# Patient Record
Sex: FEMALE | Race: WHITE | NOT HISPANIC OR LATINO | Employment: OTHER | URBAN - METROPOLITAN AREA
[De-identification: names, ages, dates, MRNs, and addresses within clinical notes are randomized per-mention and may not be internally consistent; named-entity substitution may affect disease eponyms.]

---

## 2017-03-15 PROBLEM — F32.A ANXIETY AND DEPRESSION: Status: ACTIVE | Noted: 2017-03-15

## 2017-03-15 PROBLEM — F41.9 ANXIETY AND DEPRESSION: Status: ACTIVE | Noted: 2017-03-15

## 2017-12-01 PROBLEM — K76.0 FATTY LIVER: Status: ACTIVE | Noted: 2017-12-01

## 2019-06-13 PROBLEM — K21.9 GASTROESOPHAGEAL REFLUX DISEASE WITHOUT ESOPHAGITIS: Status: ACTIVE | Noted: 2019-06-13

## 2020-02-20 PROBLEM — M70.61 TROCHANTERIC BURSITIS OF RIGHT HIP: Status: ACTIVE | Noted: 2020-02-20

## 2021-04-07 PROBLEM — G60.9 IDIOPATHIC PERIPHERAL NEUROPATHY: Status: ACTIVE | Noted: 2021-04-07

## 2021-05-21 PROBLEM — R25.1 TREMOR: Status: ACTIVE | Noted: 2021-05-21

## 2022-11-17 ENCOUNTER — LAB VISIT (OUTPATIENT)
Dept: LAB | Facility: HOSPITAL | Age: 72
End: 2022-11-17
Attending: NURSE PRACTITIONER
Payer: MEDICARE

## 2022-11-17 DIAGNOSIS — K58.2 IRRITABLE BOWEL SYNDROME WITH CONSTIPATION AND DIARRHEA: ICD-10-CM

## 2022-11-17 DIAGNOSIS — K52.9 INFLAMMATORY BOWEL DISEASE: ICD-10-CM

## 2022-11-17 DIAGNOSIS — R10.13 ABDOMINAL PAIN, EPIGASTRIC: ICD-10-CM

## 2022-11-17 DIAGNOSIS — K58.2 IRRITABLE BOWEL SYNDROME WITH CONSTIPATION AND DIARRHEA: Primary | ICD-10-CM

## 2022-11-17 PROCEDURE — 82653 EL-1 FECAL QUANTITATIVE: CPT | Performed by: NURSE PRACTITIONER

## 2022-11-21 LAB — ELASTASE 1, FECAL: 321 MCG/G

## 2023-09-12 ENCOUNTER — CLINICAL SUPPORT (OUTPATIENT)
Dept: INTERNAL MEDICINE | Facility: CLINIC | Age: 73
End: 2023-09-12
Payer: MEDICARE

## 2023-09-12 DIAGNOSIS — Z71.3 NUTRITIONAL COUNSELING: Primary | ICD-10-CM

## 2023-09-12 PROCEDURE — 97802 MEDICAL NUTRITION INDIV IN: CPT | Mod: S$GLB,,,

## 2023-09-12 PROCEDURE — 97802 PR MED NUTR THER, 1ST, INDIV, EA 15 MIN: ICD-10-PCS | Mod: S$GLB,,,

## 2023-09-12 NOTE — PROGRESS NOTES
"Nutrition Assessment  Session Time:  60 minutes      Client name:  Carole Sarabia  :  1950  Age:  73 y.o.  Gender:  female    Client states: Patient reports she has Type 2 diabetes, IBS and gluten intolerance. Interested in discussing healthy food choices with these dietary restrictions. Patient sees Dr. Tanmay Olivo (gastrologist). Enjoys cooking sometimes, but cant stand for too long. Comfortable reading food labels.     Anthropometrics  Height:  5'7"     Weight:  171  BMI:  26.8    Clinical Signs/Symptoms  N/V/D:  IBS, upset stomach after eating gluten   Appetite:  good       Past Medical History:   Diagnosis Date    Depression     Fibrocystic breast     GERD (gastroesophageal reflux disease)     Goiter     Hyperlipidemia     Hypertension     Hypothyroidism        Past Surgical History:   Procedure Laterality Date    CHOLECYSTECTOMY      THUMB ARTHROSCOPY         Medications    has a current medication list which includes the following prescription(s): alprazolam, aspirin, atorvastatin, eb-n5 dr, benicar, bupropion, cholecalciferol (vitamin d3), cyanocobalamin-cobamamide, dicyclomine, diphenoxylate-atropine 2.5-0.025 mg, esomeprazole magnesium, folic acid/multivit-min/lutein, gabapentin, hydrocodone-acetaminophen, lactobacillus rhamnosus gg, levocetirizine, levothyroxine, levothyroxine, levothyroxine, metoprolol succinate, ondansetron, potassium, simvastatin, terbinafine hcl, thyroid (pork), and topiramate.    Vitamins, Minerals, and/or Supplements:  multivitamin One A Day 50+, Vitamin D3     Food/Medication Interactions:  Reviewed     Food Allergies or Intolerances:  gluten     Social History    Marital status:    Employment:  retired    Social History     Tobacco Use    Smoking status: Never    Smokeless tobacco: Never   Substance Use Topics    Alcohol use: No        Lab Reports   Sodium   Date Value Ref Range Status   02/10/2021 142 136 - 145 mmol/L Final     Potassium   Date Value Ref Range " Status   02/10/2021 3.8 3.5 - 5.1 mmol/L Final     Chloride   Date Value Ref Range Status   02/10/2021 106 95 - 110 mmol/L Final     CO2   Date Value Ref Range Status   02/10/2021 26 22 - 31 mmol/L Final     Glucose   Date Value Ref Range Status   02/10/2021 114 (H) 70 - 110 mg/dL Final     Comment:     The ADA recommends the following guidelines for fasting glucose:    Normal:       less than 100 mg/dL    Prediabetes:  100 mg/dL to 125 mg/dL    Diabetes:     126 mg/dL or higher       BUN   Date Value Ref Range Status   02/10/2021 18 7 - 18 mg/dL Final     Creatinine   Date Value Ref Range Status   02/10/2021 0.87 0.50 - 1.40 mg/dL Final     Calcium   Date Value Ref Range Status   02/10/2021 11.1 (H) 8.4 - 10.2 mg/dL Final     Total Protein   Date Value Ref Range Status   02/10/2021 7.4 6.0 - 8.4 g/dL Final     Albumin   Date Value Ref Range Status   02/10/2021 4.7 3.5 - 5.2 g/dL Final     Total Bilirubin   Date Value Ref Range Status   02/10/2021 0.5 0.2 - 1.3 mg/dL Final     Alkaline Phosphatase   Date Value Ref Range Status   02/10/2021 107 38 - 145 U/L Final     AST   Date Value Ref Range Status   02/10/2021 63 (H) 14 - 36 U/L Final     ALT   Date Value Ref Range Status   02/10/2021 58 (H) 0 - 35 U/L Final     Anion Gap   Date Value Ref Range Status   02/10/2021 10 8 - 16 mmol/L Final     eGFR if    Date Value Ref Range Status   02/10/2021 >60 >60 mL/min/1.73 m^2 Final     eGFR if non    Date Value Ref Range Status   02/10/2021 >60 >60 mL/min/1.73 m^2 Final     Comment:     Calculation used to obtain the estimated glomerular filtration  rate (eGFR) is the CKD-EPI equation.         Lab Results   Component Value Date    WBC 8.43 02/10/2021    HGB 15.5 02/10/2021    HCT 46.4 02/10/2021    MCV 94 02/10/2021     02/10/2021        Lab Results   Component Value Date    CHOL 154 10/21/2020     Lab Results   Component Value Date    HDL 54 10/21/2020     Lab Results   Component Value  "Date    LDLCALC 69.0 10/21/2020     Lab Results   Component Value Date    TRIG 155 (H) 10/21/2020     Lab Results   Component Value Date    CHOLHDL 35.1 10/21/2020     No results found for: "LABA1C", "HGBA1C"  BP Readings from Last 1 Encounters:   02/11/23 (!) 177/80       Food History  Breakfast:  scrambled eggs, 2 sausage links, sugar-free cranberry and pineapple juice, blueberry muffins sometimes  Mid-morning Snack: eating more fruit since she found out she is diabetic- grapes, banana, sugar-free ice cream/popsicles  Lunch:  green salad with chicken, rotisserie chicken, fried fish, fish sandwich from [employer], limited fried foods, shrimp  Mid-afternoon Snack:  n/a  Dinner:  baked potato or sweet potatoes, Fritos and chili, homemade Chili with cheese  Snack:  stopped eating sweets and already lost 10 lbs  *Fluid intake:  a lot of water, tea with Splenda, coffee with Splenda and Coffee Mate creamer, no soda anymore    Exercise History:  rides stationary bike occasionally, walk around the house a lot, read a lot of articles on phone, read books    Cultural/Spiritual/Personal Preferences:  None identified    Support System:  spouse    State of Change:  Contemplation    Barriers to Change:  gluten dietary restrictions     Diagnosis    Excessive sugar intake related to increased intake of processed foods as evidenced by elevated A1c.    Intervention    RMR (Method:  [place] [address] Jeor):  1305 kcal  Activity Factor:  1.2    WHITNEY:  1566 kcal    Goals:  1.  Continue eating balanced meals, eggs at breakfast  2.  Continue eating less sweets and more healthy options we discussed today  3.  Increase exercise- 30 minutes walking per day     Macronutrient Goals:   Carbohydrates- 40%- 156 g  Protein- 30%- 117 g   Fat- 30%- 52 g     Nutrition Education  The following education was provided to the patient:  Complimented patient on proactive role in health maintenance.  Complimented patient on dietary compliance/modifications and " resulting health improvements.  Complimented patient on physical activity efforts.  Discussed CHO Counting.   Discussed meal planning/[employer] My Plate design.  Discussed healthy snacking.   Discussed label reading.  Discussed weight management.  Discussed Heart Healthy Eating.  Suggested dietary modifications based on current dietary behaviors and individual food preferences.  Discussed macronutrient distribution among meals and snacks, including CHO, protein, and fat recommendations and its importance/benefits.  Discussed physical activity guidelines and its associated benefits.  Discussed tips for eating healthy when dining out.  Discussed nutrition-related lab values and dietary and/or lifestyle factors affecting them.  Discussed sugary foods and/or beverages (potential health consequences of excessive sugar intake, ways to reduce sugar intake, healthy beverage alternatives, etc.).  Discussed recommended servings of fruit/day and food sources of such.  Discussed recommended servings of non-starchy vegetables/day and food sources of such.  Discussed recommended fiber intake and food sources of such.  Discussed benefits of adequate hydration and recommended fluid intake.  Discussed [place] client resources (may include but not limited to Eat Fit Shopping List, Fueling Well on the Go, and Meal Planning Guide).  Discussed vitamin/mineral recommendations (may include but not limited to MVI, Vitamin D, Calcium, and/or Iron) and associated food sources.  Discussed importance of small behavior/habit changes in improving long-term adherence and sustainability.  Discussed goal setting.  Discussed the pros and cons of fad diets and provided recommendations regarding short and long term adherence if applicable.  Provided ongoing support, encouragement, and guidance toward improved health efforts.    Patient verbalized understanding of nutrition education and recommendations received.    Handouts Provided  Meal Planning Guide  Eat  Fit Shopping List  Eat Fit Teresa  Fueling Well On-The-Go    Monitoring/Evaluation    Monitor the following:  Weight  BMI  % Body Fat  Caloric intake      Follow Up Plan:  Communication with referring healthcare provider is unnecessary at this time as patient presented as part of annual wellness exam.  However, will follow up with patient in 1-2 years.

## 2023-09-13 ENCOUNTER — TELEPHONE (OUTPATIENT)
Dept: SPORTS MEDICINE | Facility: CLINIC | Age: 73
End: 2023-09-13
Payer: MEDICARE

## 2023-09-13 DIAGNOSIS — Z71.3 NUTRITIONAL COUNSELING: Primary | ICD-10-CM

## 2023-09-13 NOTE — TELEPHONE ENCOUNTER
----- Message from Verenice Rodríguez sent at 9/13/2023 12:13 PM CDT -----  Regarding: Nutrition Referral  Good Afternoon    Can you please enter a Nutrition Consult referral ([id number]) for Enriqueta Sarabia for Nutritional Counseling.  Thank you!    Verenice Rodríguez

## 2023-09-13 NOTE — PATIENT INSTRUCTIONS
Name: Carole Sarabia   Date: 09/13/2023    Recommended daily energy requirements to reach your goals:  1566 Calories  117 grams Protein  156 grams Carbohydrates  52 grams Fat  86 ounces of fluid per day

## 2023-09-15 ENCOUNTER — TELEPHONE (OUTPATIENT)
Dept: SPORTS MEDICINE | Facility: CLINIC | Age: 73
End: 2023-09-15
Payer: MEDICARE

## 2023-09-15 DIAGNOSIS — E11.9 DIABETES MELLITUS, TYPE II: Primary | ICD-10-CM

## 2023-09-15 DIAGNOSIS — E11.9 DIABETES: ICD-10-CM

## 2023-09-15 DIAGNOSIS — Z71.3 NUTRITIONAL COUNSELING: ICD-10-CM

## 2023-09-15 NOTE — TELEPHONE ENCOUNTER
----- Message from Verenice Marcos sent at 9/15/2023 12:27 PM CDT -----  Regarding: RE: Nutrition Referral  Hi there!    Okay, thanks for that info! She told me she was diagnosed with Type 2 diabetes from a non-[facility] provider. Is there a way we can add this diagnosis so her visit would be covered by insurance?    Thanks!  ----- Message -----  From: Cheyenne Sarabia  Sent: 9/13/2023  12:45 PM CDT  To: Verenice Rodríguez  Subject: RE: Nutrition Referral                           Verenice -     Pt does not have a billable diagnosis per insurance, Medicare. Pt's estimated $92 out of pocket cost. In order to proceed with signing order without a billable diagnosis, the pt needs to sign a waiver.     Are you able to reach out to pt's PCP for referral & completion of waiver?    Please let us know if you have any additional questions or concerns.     Thank you-    Cheyenne Sarabia MS, OTC  Clinical Assistant to Je Galicia MD  [facility] [place]  P: [phone]  F: [phone]    ----- Message -----  From: Verenice Rodríguez  Sent: 9/13/2023  12:16 PM CDT  To: [facility] Staff  Subject: Nutrition Referral                               Good Afternoon    Can you please enter a Nutrition Consult referral ([id number]) for Enriqueta Sarabia for Nutritional Counseling.  Thank you!    Verenice Rodríguez

## 2023-09-18 ENCOUNTER — TELEPHONE (OUTPATIENT)
Dept: OBSTETRICS AND GYNECOLOGY | Facility: CLINIC | Age: 73
End: 2023-09-18
Payer: MEDICARE

## 2023-09-18 NOTE — TELEPHONE ENCOUNTER
----- Message from Rachel Tran sent at 9/15/2023  3:58 PM CDT -----  Regarding: sooner appt  Contact: pt  Type:  Sooner Apoointment Request    Caller is requesting a sooner appointment.  Caller declined first available appointment listed below.  Caller will not accept being placed on the waitlist and is requesting a message be sent to doctor.  Name of Caller:pt    Symptoms:sharp pains due to vaginal prolapse    Would the patient rather a call back or a response via [facility]? Call back    Best Call Back Number:[phone]    Additional Information: sts she would like to get an appt for vaginal Prolapse--please advise and thank you

## 2023-12-11 ENCOUNTER — TELEPHONE (OUTPATIENT)
Dept: OBSTETRICS AND GYNECOLOGY | Facility: CLINIC | Age: 73
End: 2023-12-11
Payer: MEDICARE

## 2023-12-11 NOTE — TELEPHONE ENCOUNTER
I left a voicemail for the patient with an appt on 1/4/24 at 3pm with Dr. Rose with instructions to call back if that did not work for her.

## 2023-12-11 NOTE — TELEPHONE ENCOUNTER
----- Message from Hari Osorio MA sent at 12/11/2023 11:06 AM CST -----  Contact: Self    ----- Message -----  From: Isabel Simpson  Sent: 11/30/2023   2:59 PM CST  To: #    Type:  Sooner Appointment Request    Caller is requesting a sooner appointment.  Caller declined first available appointment listed below.  Caller will not accept being placed on the waitlist and is requesting a message be sent to doctor.    Name of Caller:  Self  When is the first available appointment?  12/20/23  Symptoms:  vaginal prolapse and UTI  Would the patient rather a call back or a response via [facility]? call  Best Call Back Number:  [phone]  Please call the patient back at the phone number listed above to advise. Thank you!

## 2024-01-04 ENCOUNTER — OFFICE VISIT (OUTPATIENT)
Dept: OBSTETRICS AND GYNECOLOGY | Facility: CLINIC | Age: 74
End: 2024-01-04
Payer: MEDICARE

## 2024-01-04 VITALS
WEIGHT: 162.69 LBS | BODY MASS INDEX: 25.48 KG/M2 | DIASTOLIC BLOOD PRESSURE: 72 MMHG | SYSTOLIC BLOOD PRESSURE: 122 MMHG

## 2024-01-04 DIAGNOSIS — R39.15 URINARY URGENCY: Primary | ICD-10-CM

## 2024-01-04 LAB
BILIRUBIN, UA POC OHS: NEGATIVE
BLOOD, UA POC OHS: NEGATIVE
CLARITY, UA POC OHS: CLEAR
COLOR, UA POC OHS: ABNORMAL
GLUCOSE, UA POC OHS: NEGATIVE
KETONES, UA POC OHS: NEGATIVE
LEUKOCYTES, UA POC OHS: ABNORMAL
NITRITE, UA POC OHS: POSITIVE
PH, UA POC OHS: 5.5
PROTEIN, UA POC OHS: ABNORMAL
SPECIFIC GRAVITY, UA POC OHS: >=1.03
UROBILINOGEN, UA POC OHS: 0.2

## 2024-01-04 PROCEDURE — 99999 PR PBB SHADOW E&M-EST. PATIENT-LVL II: CPT | Mod: PBBFAC,,, | Performed by: SPECIALIST

## 2024-01-04 PROCEDURE — 99212 OFFICE O/P EST SF 10 MIN: CPT | Mod: PBBFAC,PN | Performed by: SPECIALIST

## 2024-01-04 PROCEDURE — 99203 OFFICE O/P NEW LOW 30 MIN: CPT | Mod: S$PBB,,, | Performed by: SPECIALIST

## 2024-01-04 PROCEDURE — 81003 URINALYSIS AUTO W/O SCOPE: CPT | Mod: PBBFAC,PN | Performed by: SPECIALIST

## 2024-01-04 PROCEDURE — 87086 URINE CULTURE/COLONY COUNT: CPT | Performed by: SPECIALIST

## 2024-01-04 PROCEDURE — 99999PBSHW POCT URINALYSIS(INSTRUMENT): Mod: PBBFAC,,,

## 2024-01-04 RX ORDER — PHENAZOPYRIDINE HYDROCHLORIDE 200 MG/1
200 TABLET, FILM COATED ORAL 3 TIMES DAILY
COMMUNITY
Start: 2023-12-21

## 2024-01-06 LAB
BACTERIA UR CULT: NORMAL
BACTERIA UR CULT: NORMAL

## 2024-01-16 NOTE — PROGRESS NOTES
72 yo WF  presents for eval possible uterine prolapse  and UTI with complaint urinary frequency and discomfort denies overt dysuria, hematuria, vaginal d/c, f/c, n/v  Past Medical History:   Diagnosis Date    Depression     Fibrocystic breast     GERD (gastroesophageal reflux disease)     Goiter     Hyperlipidemia     Hypertension     Hypothyroidism        Past Surgical History:   Procedure Laterality Date    CHOLECYSTECTOMY      THUMB ARTHROSCOPY         Family History   Problem Relation Age of Onset    Arthritis Mother     Heart disease Father     Cancer Father     Kidney cancer Father     No Known Problems Sister     Diabetes Brother        Social History     Socioeconomic History    Marital status:     Number of children: 2   Occupational History    Occupation: housewife   Tobacco Use    Smoking status: Never    Smokeless tobacco: Never   Substance and Sexual Activity    Alcohol use: No    Drug use: No    Sexual activity: Not Currently       Current Outpatient Medications   Medication Sig Dispense Refill    aspirin (ECOTRIN) 81 MG EC tablet Take 81 mg by mouth once daily.      atorvastatin (LIPITOR) 10 MG tablet Take 10 mg by mouth once daily.      B6-levomefolate-B12-D3-ALA (EB-N5 DR) 35 mg-3 mg- 2 mg-62.5 mcg CpDR Take 1 capsule by mouth Daily.      phenazopyridine (PYRIDIUM) 200 MG tablet Take 200 mg by mouth 3 (three) times daily.      ALPRAZolam (XANAX) 0.5 MG tablet Take 1 tablet (0.5 mg total) by mouth nightly as needed for Anxiety. 30 tablet 0    BENICAR 20 mg tablet Take 20 mg by mouth once daily.  3    buPROPion (WELLBUTRIN) 100 MG tablet TAKE 1 TABLET (100 MG TOTAL) BY MOUTH 3 (THREE) TIMES DAILY. 270 tablet 1    cholecalciferol, vitamin D3, (VITAMIN D3) 50 mcg (2,000 unit) Cap Take 1 capsule by mouth once daily.      cyanocobalamin-cobamamide 5,000-100 mcg Subl Place under the tongue.      dicyclomine (BENTYL) 20 mg tablet Take 20 mg by mouth every 6 (six) hours.       diphenoxylate-atropine 2.5-0.025 mg (LOMOTIL) 2.5-0.025 mg per tablet TAKE 1 TABLET BY MOUTH THREE TIMES A DAY AS NEEDED DIARRHEA 30 tablet 1    esomeprazole magnesium (NEXIUM ORAL) Take 20 mg by mouth once daily.       folic acid/multivit-min/lutein (CENTRUM SILVER ORAL) Take 1 capsule by mouth once daily.      gabapentin (NEURONTIN) 100 MG capsule Take 1 capsule (100 mg total) by mouth 2 (two) times daily. 180 capsule 3    HYDROcodone-acetaminophen (NORCO) 5-325 mg per tablet Take 1 tablet by mouth every 6 (six) hours as needed for Pain. 15 tablet 0    Lactobacillus rhamnosus GG (CULTURELLE) 10 billion cell capsule Take 1 capsule by mouth once daily.      levocetirizine (XYZAL) 5 MG tablet Take 1 tablet (5 mg total) by mouth every evening. 90 tablet 3    levothyroxine (LEVOXYL) 25 MCG tablet One daily 30 tablet 2    levothyroxine (LEVOXYL) 25 MCG tablet One daily 90 tablet 0    levothyroxine (SYNTHROID) 25 MCG tablet Take 1 tablet (25 mcg total) by mouth before breakfast. 90 tablet 1    metoprolol succinate (TOPROL-XL) 50 MG 24 hr tablet TAKE 1/2 TABLET BY MOUTH TWICE A DAY. NEW DIRECTIONS 90 tablet 4    ondansetron (ZOFRAN-ODT) 4 MG TbDL Take 1-2 tablets (4-8 mg total) by mouth every 8 (eight) hours as needed. 15 tablet 0    potassium 99 mg Tab Take 1 tablet by mouth once.      simvastatin (ZOCOR) 20 MG tablet Take 20 mg by mouth every evening.      terbinafine HCL (LAMISIL) 250 mg tablet Take 250 mg by mouth once daily.      thyroid, pork, (ARMOUR THYROID) 30 mg Tab Take 1 tablet (30 mg total) by mouth before breakfast. 30 tablet 1    topiramate (TOPAMAX) 25 MG tablet TAKE 1 TABLET BY MOUTH EVERY DAY AT NIGHT 90 tablet 3     No current facility-administered medications for this visit.       Review of patient's allergies indicates:   Allergen Reactions    Gluten     Codeine Nausea Only    Sulfa (sulfonamide antibiotics) Nausea Only       Review of System:   General: no chills, fever, night sweats, weight gain or  weight loss  Psychological: no depression or suicidal ideation  Breasts: no new or changing breast lumps, nipple discharge or masses.  Respiratory: no cough, shortness of breath, or wheezing  Cardiovascular: no chest pain or dyspnea on exertion  Gastrointestinal: no abdominal pain, change in bowel habits, or black or bloody stools  Genito-Urinary: Occasional urinary incontinence,Positive  urinary frequency/urgency No  pelvic pain or abnormal vaginal bleeding.  Musculoskeletal: no gait disturbance or muscular weakness                                               General Appearance    A and O x 4, Cooperative, no distress   Breasts    Abdomen   Deferred   Soft, non-tender, bowel sounds active all four quadrants,  no masses, no organomegaly    Genitourinary:   External rectal exam shows no thrombosed external hemorrhoids.   Pelvic exam was performed with patient supine.  No labial fusion.  There is no rash, lesion or injury on the right labia.  There is no rash, lesion or injury on the left labia.  No bleeding and no signs of injury around the vaginal introitus, urethra is without lesions and well supported. The cervix is visualized with no discharge, lesions or friability.  No vaginal discharge found.  Grade 1 -2 midline cystocele with POP 1 cervicouterine prolapse   Bimanual exam:  The urethra is normal to palpation and there are no palpable vaginal wall masses.  Uterus is not deviated, not enlarged, not fixed, normal shape and not tender.  Cervix exhibits no motion tenderness.   Right adnexum displays no mass and no tenderness.  Left adnexum displays no mass and no tenderness.   Extremities: Extremities normal, atraumatic, no cyanosis or edema                     NOTE  NURSING PERSONAL PRESENT FOR ENTIRE PHYSICAL EXAM     U/A neg LE neg Nitrite    Discussed POP and suspected urinary frequency secondary to the above  Rec U/a C and s and discussed treatment options  Will follow the above  Answered all questions    I  spent a total of 30 minutes on the day of the visit. This includes face to face time and non-face to face time preparing to see the patient (eg, review of tests), obtaining and/or reviewing separately obtained history, documenting clinical information in the electronic or other health record, independently interpreting results and communicating results to the patient/family/caregiver, or care coordinator.

## 2024-02-05 ENCOUNTER — TELEPHONE (OUTPATIENT)
Dept: OBSTETRICS AND GYNECOLOGY | Facility: CLINIC | Age: 74
End: 2024-02-05
Payer: MEDICARE

## 2024-02-05 NOTE — TELEPHONE ENCOUNTER
Spoke with patient.  Patient would like to further discuss tx options for prolapse.  Interested in pessary. Patient is seeing a different office for this and has had trouble getting pessary ordered. Pt advised we can not guarantee pessary or amount of time it may take to receive the pessary she may need without a visit. Patient scheduled for next available appt.  Pt expressed verbal understanding.

## 2024-02-05 NOTE — TELEPHONE ENCOUNTER
----- Message from Martha Ward sent at 2/5/2024 11:47 AM CST -----  Contact: pt  Type: Needs Medical Advice  Who Called:  pt  Best Call Back Number: [phone]    Additional Information: pt is calling the office needs to schedule appt. Please call back and advise.

## 2024-02-22 ENCOUNTER — OFFICE VISIT (OUTPATIENT)
Dept: OBSTETRICS AND GYNECOLOGY | Facility: CLINIC | Age: 74
End: 2024-02-22
Payer: MEDICARE

## 2024-02-22 VITALS
HEIGHT: 67 IN | WEIGHT: 163.13 LBS | BODY MASS INDEX: 25.6 KG/M2 | DIASTOLIC BLOOD PRESSURE: 76 MMHG | SYSTOLIC BLOOD PRESSURE: 124 MMHG

## 2024-02-22 DIAGNOSIS — N81.4 CYSTOCELE WITH UTERINE PROLAPSE: ICD-10-CM

## 2024-02-22 DIAGNOSIS — N81.4 UTERINE PROLAPSE: Primary | ICD-10-CM

## 2024-02-22 PROCEDURE — 99999 PR PBB SHADOW E&M-EST. PATIENT-LVL IV: CPT | Mod: PBBFAC,,, | Performed by: SPECIALIST

## 2024-02-22 PROCEDURE — 99213 OFFICE O/P EST LOW 20 MIN: CPT | Mod: S$PBB,,, | Performed by: SPECIALIST

## 2024-02-22 PROCEDURE — 99214 OFFICE O/P EST MOD 30 MIN: CPT | Mod: PBBFAC,PN | Performed by: SPECIALIST

## 2024-02-22 NOTE — PROGRESS NOTES
72 yo WF  presents for eval possible uterine prolapse  and UTI with complaint urinary frequency and discomfort denies overt dysuria, hematuria, vaginal d/c, f/c, n/v       Past Medical History:   Diagnosis Date    Depression      Fibrocystic breast      GERD (gastroesophageal reflux disease)      Goiter      Hyperlipidemia      Hypertension      Hypothyroidism                 Past Surgical History:   Procedure Laterality Date    CHOLECYSTECTOMY        THUMB ARTHROSCOPY                   Family History   Problem Relation Age of Onset    Arthritis Mother      Heart disease Father      Cancer Father      Kidney cancer Father      No Known Problems Sister      Diabetes Brother           Social History           Socioeconomic History    Marital status:     Number of children: 2   Occupational History    Occupation: housewife   Tobacco Use    Smoking status: Never    Smokeless tobacco: Never   Substance and Sexual Activity    Alcohol use: No    Drug use: No    Sexual activity: Not Currently         Current Medications          Current Outpatient Medications   Medication Sig Dispense Refill    aspirin (ECOTRIN) 81 MG EC tablet Take 81 mg by mouth once daily.        atorvastatin (LIPITOR) 10 MG tablet Take 10 mg by mouth once daily.        B6-levomefolate-B12-D3-ALA (EB-N5 DR) 35 mg-3 mg- 2 mg-62.5 mcg CpDR Take 1 capsule by mouth Daily.        phenazopyridine (PYRIDIUM) 200 MG tablet Take 200 mg by mouth 3 (three) times daily.        ALPRAZolam (XANAX) 0.5 MG tablet Take 1 tablet (0.5 mg total) by mouth nightly as needed for Anxiety. 30 tablet 0    BENICAR 20 mg tablet Take 20 mg by mouth once daily.   3    buPROPion (WELLBUTRIN) 100 MG tablet TAKE 1 TABLET (100 MG TOTAL) BY MOUTH 3 (THREE) TIMES DAILY. 270 tablet 1    cholecalciferol, vitamin D3, (VITAMIN D3) 50 mcg (2,000 unit) Cap Take 1 capsule by mouth once daily.        cyanocobalamin-cobamamide 5,000-100 mcg Subl Place under the tongue.         dicyclomine (BENTYL) 20 mg tablet Take 20 mg by mouth every 6 (six) hours.        diphenoxylate-atropine 2.5-0.025 mg (LOMOTIL) 2.5-0.025 mg per tablet TAKE 1 TABLET BY MOUTH THREE TIMES A DAY AS NEEDED DIARRHEA 30 tablet 1    esomeprazole magnesium (NEXIUM ORAL) Take 20 mg by mouth once daily.         folic acid/multivit-min/lutein (CENTRUM SILVER ORAL) Take 1 capsule by mouth once daily.        gabapentin (NEURONTIN) 100 MG capsule Take 1 capsule (100 mg total) by mouth 2 (two) times daily. 180 capsule 3    HYDROcodone-acetaminophen (NORCO) 5-325 mg per tablet Take 1 tablet by mouth every 6 (six) hours as needed for Pain. 15 tablet 0    Lactobacillus rhamnosus GG (CULTURELLE) 10 billion cell capsule Take 1 capsule by mouth once daily.        levocetirizine (XYZAL) 5 MG tablet Take 1 tablet (5 mg total) by mouth every evening. 90 tablet 3    levothyroxine (LEVOXYL) 25 MCG tablet One daily 30 tablet 2    levothyroxine (LEVOXYL) 25 MCG tablet One daily 90 tablet 0    levothyroxine (SYNTHROID) 25 MCG tablet Take 1 tablet (25 mcg total) by mouth before breakfast. 90 tablet 1    metoprolol succinate (TOPROL-XL) 50 MG 24 hr tablet TAKE 1/2 TABLET BY MOUTH TWICE A DAY. NEW DIRECTIONS 90 tablet 4    ondansetron (ZOFRAN-ODT) 4 MG TbDL Take 1-2 tablets (4-8 mg total) by mouth every 8 (eight) hours as needed. 15 tablet 0    potassium 99 mg Tab Take 1 tablet by mouth once.        simvastatin (ZOCOR) 20 MG tablet Take 20 mg by mouth every evening.        terbinafine HCL (LAMISIL) 250 mg tablet Take 250 mg by mouth once daily.        thyroid, pork, (ARMOUR THYROID) 30 mg Tab Take 1 tablet (30 mg total) by mouth before breakfast. 30 tablet 1    topiramate (TOPAMAX) 25 MG tablet TAKE 1 TABLET BY MOUTH EVERY DAY AT NIGHT 90 tablet 3      No current facility-administered medications for this visit.                 Review of patient's allergies indicates:   Allergen Reactions    Gluten      Codeine Nausea Only    Sulfa (sulfonamide  antibiotics) Nausea Only         Review of System:   General: no chills, fever, night sweats, weight gain or weight loss  Psychological: no depression or suicidal ideation  Breasts: no new or changing breast lumps, nipple discharge or masses.  Respiratory: no cough, shortness of breath, or wheezing  Cardiovascular: no chest pain or dyspnea on exertion  Gastrointestinal: no abdominal pain, change in bowel habits, or black or bloody stools  Genito-Urinary: Occasional urinary incontinence,Positive  urinary frequency/urgency No  pelvic pain or abnormal vaginal bleeding.  Musculoskeletal: no gait disturbance or muscular weakness                                                                    General Appearance    A and O x 4, Cooperative, no distress   Breasts     Abdomen   Deferred   Soft, non-tender, bowel sounds active all four quadrants,  no masses, no organomegaly     Genitourinary:   External rectal exam shows no thrombosed external hemorrhoids.   Pelvic exam was performed with patient supine.  No labial fusion.  There is no rash, lesion or injury on the right labia.  There is no rash, lesion or injury on the left labia.  No bleeding and no signs of injury around the vaginal introitus, urethra is without lesions and well supported. The cervix is visualized with no discharge, lesions or friability.  No vaginal discharge found.  Grade 3  midline cystocele with POP 1 cervicouterine prolapse   Bimanual exam:  The urethra is normal to palpation and there are no palpable vaginal wall masses.  Uterus is not deviated, not enlarged, not fixed, normal shape and not tender.  Cervix exhibits no motion tenderness.   Right adnexum displays no mass and no tenderness.  Left adnexum displays no mass and no tenderness.   Extremities: Extremities normal, atraumatic, no cyanosis or edema                              NOTE  NURSING PERSONAL PRESENT FOR ENTIRE PHYSICAL EXAM      U/A neg LE neg Nitrite     Discussed POP and suspected  urinary frequency secondary to the above  I hada 20 min discussion regarding POP and discussed options of pessary fitting vs TVH with possible AXUS graft SSS  Discussed risks and benefits of each option sand after discussion, pt agreeable to pessary fitting  Will have pt RTO next week for fitting and will follow    I spent a total of 30 minutes on the day of the visit. This includes face to face time and non-face to face time preparing to see the patient (eg, review of tests), obtaining and/or reviewing separately obtained history, documenting clinical information in the electronic or other health record, independently interpreting results and communicating results to the patient/family/caregiver, or care coordinator.

## 2024-02-29 ENCOUNTER — OFFICE VISIT (OUTPATIENT)
Dept: OBSTETRICS AND GYNECOLOGY | Facility: CLINIC | Age: 74
End: 2024-02-29
Payer: MEDICARE

## 2024-02-29 VITALS
SYSTOLIC BLOOD PRESSURE: 122 MMHG | BODY MASS INDEX: 25.62 KG/M2 | DIASTOLIC BLOOD PRESSURE: 82 MMHG | WEIGHT: 163.56 LBS

## 2024-02-29 DIAGNOSIS — N81.4 UTERINE PROLAPSE: Primary | ICD-10-CM

## 2024-02-29 DIAGNOSIS — N81.4 CYSTOCELE WITH UTERINE PROLAPSE: ICD-10-CM

## 2024-02-29 DIAGNOSIS — R39.15 URINARY URGENCY: ICD-10-CM

## 2024-02-29 PROCEDURE — 99215 OFFICE O/P EST HI 40 MIN: CPT | Mod: PBBFAC,PN,25 | Performed by: SPECIALIST

## 2024-02-29 PROCEDURE — 99999 PR PBB SHADOW E&M-EST. PATIENT-LVL V: CPT | Mod: PBBFAC,,, | Performed by: SPECIALIST

## 2024-02-29 PROCEDURE — 99213 OFFICE O/P EST LOW 20 MIN: CPT | Mod: 25,S$PBB,, | Performed by: SPECIALIST

## 2024-02-29 PROCEDURE — 57160 INSERT PESSARY/OTHER DEVICE: CPT | Mod: PBBFAC,PN | Performed by: SPECIALIST

## 2024-02-29 PROCEDURE — 57160 INSERT PESSARY/OTHER DEVICE: CPT | Mod: S$PBB,,, | Performed by: SPECIALIST

## 2024-03-11 ENCOUNTER — TELEPHONE (OUTPATIENT)
Dept: OBSTETRICS AND GYNECOLOGY | Facility: CLINIC | Age: 74
End: 2024-03-11
Payer: MEDICARE

## 2024-03-11 NOTE — TELEPHONE ENCOUNTER
----- Message from July Finley sent at 3/11/2024  8:49 AM CDT -----  Type:  Needs Medical Advice    Who Called: pt   Best Call Back Number: [phone]      Additional Information:  Requesting call back wants to know if  Rosemarycamelia came in for her apt today @ 1    please advise thank you

## 2024-03-14 ENCOUNTER — TELEPHONE (OUTPATIENT)
Dept: OBSTETRICS AND GYNECOLOGY | Facility: CLINIC | Age: 74
End: 2024-03-14
Payer: MEDICARE

## 2024-03-14 NOTE — TELEPHONE ENCOUNTER
Returned call to patient, LVM pessary has not been received, message sent for ETA, will contact when received

## 2024-03-14 NOTE — TELEPHONE ENCOUNTER
----- Message from Ceasar Quintana sent at 3/14/2024 10:40 AM CDT -----  Regarding: advice  Contact: carole at [phone]  Type: Needs Medical Advice    Who Called:  Carole    Best Call Back Number: [phone] or [phone]    Additional Information: pt checking to see if pessary received and ready to schedule appt. Please try both number.

## 2024-03-26 ENCOUNTER — TELEPHONE (OUTPATIENT)
Dept: OBSTETRICS AND GYNECOLOGY | Facility: CLINIC | Age: 74
End: 2024-03-26
Payer: MEDICARE

## 2024-03-26 NOTE — TELEPHONE ENCOUNTER
----- Message from Fox Viera sent at 3/26/2024 11:20 AM CDT -----  Contact: self  Type: Needs Medical Advice  Who Called:  Patient    Best Call Back Number: [phone]    Additional Information: Pt checking to see if pessary received and ready to schedule appt. Please try both number.Please call and advise

## 2024-04-01 NOTE — PROGRESS NOTES
74 yo WF  presents for eval Grade 3 cystocele and cervicouterine prolapse  and UTI with complaint urinary frequency and discomfort denies overt dysuria, hematuria, vaginal d/c, f/c, n/v          Past Medical History:   Diagnosis Date    Depression      Fibrocystic breast      GERD (gastroesophageal reflux disease)      Goiter      Hyperlipidemia      Hypertension      Hypothyroidism                     Past Surgical History:   Procedure Laterality Date    CHOLECYSTECTOMY        THUMB ARTHROSCOPY                       Family History   Problem Relation Age of Onset    Arthritis Mother      Heart disease Father      Cancer Father      Kidney cancer Father      No Known Problems Sister      Diabetes Brother           Social History              Socioeconomic History    Marital status:     Number of children: 2   Occupational History    Occupation: housewife   Tobacco Use    Smoking status: Never    Smokeless tobacco: Never   Substance and Sexual Activity    Alcohol use: No    Drug use: No    Sexual activity: Not Currently         Current Medications               Current Outpatient Medications   Medication Sig Dispense Refill    aspirin (ECOTRIN) 81 MG EC tablet Take 81 mg by mouth once daily.        atorvastatin (LIPITOR) 10 MG tablet Take 10 mg by mouth once daily.        B6-levomefolate-B12-D3-ALA (EB-N5 DR) 35 mg-3 mg- 2 mg-62.5 mcg CpDR Take 1 capsule by mouth Daily.        phenazopyridine (PYRIDIUM) 200 MG tablet Take 200 mg by mouth 3 (three) times daily.        ALPRAZolam (XANAX) 0.5 MG tablet Take 1 tablet (0.5 mg total) by mouth nightly as needed for Anxiety. 30 tablet 0    BENICAR 20 mg tablet Take 20 mg by mouth once daily.   3    buPROPion (WELLBUTRIN) 100 MG tablet TAKE 1 TABLET (100 MG TOTAL) BY MOUTH 3 (THREE) TIMES DAILY. 270 tablet 1    cholecalciferol, vitamin D3, (VITAMIN D3) 50 mcg (2,000 unit) Cap Take 1 capsule by mouth once daily.        cyanocobalamin-cobamamide 5,000-100 mcg Subl  Place under the tongue.        dicyclomine (BENTYL) 20 mg tablet Take 20 mg by mouth every 6 (six) hours.        diphenoxylate-atropine 2.5-0.025 mg (LOMOTIL) 2.5-0.025 mg per tablet TAKE 1 TABLET BY MOUTH THREE TIMES A DAY AS NEEDED DIARRHEA 30 tablet 1    esomeprazole magnesium (NEXIUM ORAL) Take 20 mg by mouth once daily.         folic acid/multivit-min/lutein (CENTRUM SILVER ORAL) Take 1 capsule by mouth once daily.        gabapentin (NEURONTIN) 100 MG capsule Take 1 capsule (100 mg total) by mouth 2 (two) times daily. 180 capsule 3    HYDROcodone-acetaminophen (NORCO) 5-325 mg per tablet Take 1 tablet by mouth every 6 (six) hours as needed for Pain. 15 tablet 0    Lactobacillus rhamnosus GG (CULTURELLE) 10 billion cell capsule Take 1 capsule by mouth once daily.        levocetirizine (XYZAL) 5 MG tablet Take 1 tablet (5 mg total) by mouth every evening. 90 tablet 3    levothyroxine (LEVOXYL) 25 MCG tablet One daily 30 tablet 2    levothyroxine (LEVOXYL) 25 MCG tablet One daily 90 tablet 0    levothyroxine (SYNTHROID) 25 MCG tablet Take 1 tablet (25 mcg total) by mouth before breakfast. 90 tablet 1    metoprolol succinate (TOPROL-XL) 50 MG 24 hr tablet TAKE 1/2 TABLET BY MOUTH TWICE A DAY. NEW DIRECTIONS 90 tablet 4    ondansetron (ZOFRAN-ODT) 4 MG TbDL Take 1-2 tablets (4-8 mg total) by mouth every 8 (eight) hours as needed. 15 tablet 0    potassium 99 mg Tab Take 1 tablet by mouth once.        simvastatin (ZOCOR) 20 MG tablet Take 20 mg by mouth every evening.        terbinafine HCL (LAMISIL) 250 mg tablet Take 250 mg by mouth once daily.        thyroid, pork, (ARMOUR THYROID) 30 mg Tab Take 1 tablet (30 mg total) by mouth before breakfast. 30 tablet 1    topiramate (TOPAMAX) 25 MG tablet TAKE 1 TABLET BY MOUTH EVERY DAY AT NIGHT 90 tablet 3      No current facility-administered medications for this visit.                    Review of patient's allergies indicates:   Allergen Reactions    Gluten      Codeine  Nausea Only    Sulfa (sulfonamide antibiotics) Nausea Only         Review of System:   General: no chills, fever, night sweats, weight gain or weight loss  Psychological: no depression or suicidal ideation  Breasts: no new or changing breast lumps, nipple discharge or masses.  Respiratory: no cough, shortness of breath, or wheezing  Cardiovascular: no chest pain or dyspnea on exertion  Gastrointestinal: no abdominal pain, change in bowel habits, or black or bloody stools  Genito-Urinary: Occasional urinary incontinence,Positive  urinary frequency/urgency No  pelvic pain or abnormal vaginal bleeding.  Musculoskeletal: no gait disturbance or muscular weakness                                                                    General Appearance    A and O x 4, Cooperative, no distress   Breasts     Abdomen   Deferred   Soft, non-tender, bowel sounds active all four quadrants,  no masses, no organomegaly     Genitourinary:   External rectal exam shows no thrombosed external hemorrhoids.   Pelvic exam was performed with patient supine.  No labial fusion.  There is no rash, lesion or injury on the right labia.  There is no rash, lesion or injury on the left labia.  No bleeding and no signs of injury around the vaginal introitus, urethra is without lesions and well supported. The cervix is visualized with no discharge, lesions or friability.  No vaginal discharge found.  Grade 3  midline cystocele with POP 1 cervicouterine prolapse   Bimanual exam:  The urethra is normal to palpation and there are no palpable vaginal wall masses.  Uterus is not deviated, not enlarged, not fixed, normal shape and not tender.  Cervix exhibits no motion tenderness.   Right adnexum displays no mass and no tenderness.  Left adnexum displays no mass and no tenderness.   Extremities: Extremities normal, atraumatic, no cyanosis or edema                              NOTE  NURSING PERSONAL PRESENT FOR ENTIRE PHYSICAL EXAM      U/A neg LE neg  Nitrite    Pessary fitting trial    Digital forefinger measurement sacral spine 5cm 6cm pubic rami with  3cm Gelhorne placed high at the cervix  Pt ambulated and supine Valsalva with good anterior support No decent    Plan  Remove pessary and will order and schedule placement    I spent a total of 30 minutes on the day of the visit. This includes face to face time and non-face to face time preparing to see the patient (eg, review of tests), obtaining and/or reviewing separately obtained history, documenting clinical information in the electronic or other health record, independently interpreting results and communicating results to the patient/family/caregiver, or care coordinator.

## 2024-04-09 ENCOUNTER — TELEPHONE (OUTPATIENT)
Dept: OBSTETRICS AND GYNECOLOGY | Facility: CLINIC | Age: 74
End: 2024-04-09
Payer: MEDICARE

## 2024-04-09 NOTE — TELEPHONE ENCOUNTER
----- Message from Cr Faith sent at 4/9/2024 12:16 PM CDT -----  Regarding: advice  Type:  Needs Medical Advice    Who Called: pt    Best Call Back Number: [phone] or [phone]       Additional Information: pt wants to know if the pessary has some in yet, so she can get schedule.  please call to discuss.

## 2024-04-24 ENCOUNTER — TELEPHONE (OUTPATIENT)
Dept: OBSTETRICS AND GYNECOLOGY | Facility: CLINIC | Age: 74
End: 2024-04-24
Payer: MEDICARE

## 2024-04-24 NOTE — TELEPHONE ENCOUNTER
Returned call, LVM to notify pessary has not been delivered, email sent for estimated delivery date--will contact patient once received or receive delivery date

## 2024-04-24 NOTE — TELEPHONE ENCOUNTER
----- Message from Joi Hunter LPN sent at 4/24/2024 10:36 AM CDT -----  Regarding: FW: pessary Update    ----- Message -----  From: Farooq Liriano  Sent: 4/24/2024  10:26 AM CDT  To: [facility] Staff  Subject: pessary Update                                   Type:  Needs Medical Advice    Who Called: Pt        Pharmacy name and phone #:    [id number] [place] [id number] [address]  Phone: [phone] Fax: [phone]        Would the patient rather a call back or a response via [facility]? Call Back    Best Call Back Number: [phone]      Additional Information: Pt is checking in to see the status of her pessary that was put on back order and was told it would possibly come in on 4/22 but hasn't heard back from the office. Would like a call back asap if possible. Please advise -- Thank you

## 2024-05-01 ENCOUNTER — OFFICE VISIT (OUTPATIENT)
Dept: OBSTETRICS AND GYNECOLOGY | Facility: CLINIC | Age: 74
End: 2024-05-01
Payer: MEDICARE

## 2024-05-01 VITALS
SYSTOLIC BLOOD PRESSURE: 128 MMHG | DIASTOLIC BLOOD PRESSURE: 84 MMHG | WEIGHT: 162.06 LBS | BODY MASS INDEX: 25.38 KG/M2

## 2024-05-01 DIAGNOSIS — R39.15 URINARY URGENCY: ICD-10-CM

## 2024-05-01 DIAGNOSIS — R39.9 UTI SYMPTOMS: Primary | ICD-10-CM

## 2024-05-01 DIAGNOSIS — R82.90 ABNORMAL URINE FINDINGS: ICD-10-CM

## 2024-05-01 DIAGNOSIS — N81.4 CYSTOCELE WITH UTERINE PROLAPSE: ICD-10-CM

## 2024-05-01 DIAGNOSIS — N81.4 UTERINE PROLAPSE: ICD-10-CM

## 2024-05-01 LAB
BILIRUBIN, UA POC OHS: NEGATIVE
BLOOD, UA POC OHS: NEGATIVE
CLARITY, UA POC OHS: CLEAR
COLOR, UA POC OHS: YELLOW
GLUCOSE, UA POC OHS: NEGATIVE
KETONES, UA POC OHS: NEGATIVE
LEUKOCYTES, UA POC OHS: ABNORMAL
NITRITE, UA POC OHS: NEGATIVE
PH, UA POC OHS: 5.5
PROTEIN, UA POC OHS: NEGATIVE
SPECIFIC GRAVITY, UA POC OHS: >=1.03
UROBILINOGEN, UA POC OHS: 0.2

## 2024-05-01 PROCEDURE — 87086 URINE CULTURE/COLONY COUNT: CPT | Performed by: SPECIALIST

## 2024-05-01 PROCEDURE — 99999PBSHW POCT URINALYSIS(INSTRUMENT): Mod: PBBFAC,,,

## 2024-05-01 PROCEDURE — 99999 PR PBB SHADOW E&M-EST. PATIENT-LVL IV: CPT | Mod: PBBFAC,,, | Performed by: SPECIALIST

## 2024-05-01 PROCEDURE — 99213 OFFICE O/P EST LOW 20 MIN: CPT | Mod: S$PBB,,, | Performed by: SPECIALIST

## 2024-05-01 PROCEDURE — 81003 URINALYSIS AUTO W/O SCOPE: CPT | Mod: PBBFAC,PN | Performed by: SPECIALIST

## 2024-05-01 PROCEDURE — 99214 OFFICE O/P EST MOD 30 MIN: CPT | Mod: PBBFAC,PN | Performed by: SPECIALIST

## 2024-05-01 RX ORDER — NITROFURANTOIN 25; 75 MG/1; MG/1
100 CAPSULE ORAL 2 TIMES DAILY
Qty: 14 CAPSULE | Refills: 0 | Status: SHIPPED | OUTPATIENT
Start: 2024-05-01 | End: 2024-05-08

## 2024-05-01 RX ORDER — FLUCONAZOLE 200 MG/1
200 TABLET ORAL ONCE
Qty: 1 TABLET | Refills: 0 | Status: SHIPPED | OUTPATIENT
Start: 2024-05-01 | End: 2024-05-01

## 2024-05-01 NOTE — PROGRESS NOTES
74 yo WF followed for POP, Grade 3 cystocele presents complaint hestancy and dysuria. Denies flank pain, f/c, n/v   We have been waiting on Gelhorn pessary for over 2 months    U/A terace LE, Clear Neg Nitrite    Past Medical History:   Diagnosis Date    Depression     Fibrocystic breast     GERD (gastroesophageal reflux disease)     Goiter     Hyperlipidemia     Hypertension     Hypothyroidism        Past Surgical History:   Procedure Laterality Date    BILATERAL TUBAL LIGATION      CHOLECYSTECTOMY      THUMB ARTHROSCOPY         Family History   Problem Relation Name Age of Onset    Heart disease Father      Cancer Father      Kidney cancer Father      Arthritis Mother      Diabetes Brother      No Known Problems Sister      Breast cancer Neg Hx      Ovarian cancer Neg Hx      Colon cancer Neg Hx      Cervical cancer Neg Hx      Uterine cancer Neg Hx         Social History     Socioeconomic History    Marital status:     Number of children: 2   Occupational History    Occupation: housewife   Tobacco Use    Smoking status: Never    Smokeless tobacco: Never   Substance and Sexual Activity    Alcohol use: No    Drug use: No    Sexual activity: Not Currently     Birth control/protection: Post-menopausal       Current Outpatient Medications   Medication Sig Dispense Refill    aspirin (ECOTRIN) 81 MG EC tablet Take 81 mg by mouth once daily.      atorvastatin (LIPITOR) 10 MG tablet Take 10 mg by mouth once daily.      B6-levomefolate-B12-D3-ALA (EB-N5 DR) 35 mg-3 mg- 2 mg-62.5 mcg CpDR Take 1 capsule by mouth Daily.      BENICAR 20 mg tablet Take 20 mg by mouth once daily.  3    buPROPion (WELLBUTRIN) 100 MG tablet TAKE 1 TABLET (100 MG TOTAL) BY MOUTH 3 (THREE) TIMES DAILY. 270 tablet 1    cholecalciferol, vitamin D3, (VITAMIN D3) 50 mcg (2,000 unit) Cap Take 1 capsule by mouth once daily.      cyanocobalamin-cobamamide 5,000-100 mcg Subl Place under the tongue.      dicyclomine (BENTYL) 20 mg tablet Take 20 mg  by mouth every 6 (six) hours.      diphenoxylate-atropine 2.5-0.025 mg (LOMOTIL) 2.5-0.025 mg per tablet TAKE 1 TABLET BY MOUTH THREE TIMES A DAY AS NEEDED DIARRHEA 30 tablet 1    esomeprazole magnesium (NEXIUM ORAL) Take 20 mg by mouth once daily.       folic acid/multivit-min/lutein (CENTRUM SILVER ORAL) Take 1 capsule by mouth once daily.      HYDROcodone-acetaminophen (NORCO) 5-325 mg per tablet Take 1 tablet by mouth every 6 (six) hours as needed for Pain. 15 tablet 0    Lactobacillus rhamnosus GG (CULTURELLE) 10 billion cell capsule Take 1 capsule by mouth once daily.      levocetirizine (XYZAL) 5 MG tablet Take 1 tablet (5 mg total) by mouth every evening. 90 tablet 3    levothyroxine (LEVOXYL) 25 MCG tablet One daily 30 tablet 2    levothyroxine (LEVOXYL) 25 MCG tablet One daily 90 tablet 0    levothyroxine (SYNTHROID) 25 MCG tablet Take 1 tablet (25 mcg total) by mouth before breakfast. 90 tablet 1    metoprolol succinate (TOPROL-XL) 50 MG 24 hr tablet TAKE 1/2 TABLET BY MOUTH TWICE A DAY. NEW DIRECTIONS 90 tablet 4    ondansetron (ZOFRAN-ODT) 4 MG TbDL Take 1-2 tablets (4-8 mg total) by mouth every 8 (eight) hours as needed. 15 tablet 0    phenazopyridine (PYRIDIUM) 200 MG tablet Take 200 mg by mouth 3 (three) times daily.      potassium 99 mg Tab Take 1 tablet by mouth once.      simvastatin (ZOCOR) 20 MG tablet Take 20 mg by mouth every evening.      terbinafine HCL (LAMISIL) 250 mg tablet Take 250 mg by mouth once daily.      thyroid, pork, (ARMOUR THYROID) 30 mg Tab Take 1 tablet (30 mg total) by mouth before breakfast. 30 tablet 1    topiramate (TOPAMAX) 25 MG tablet TAKE 1 TABLET BY MOUTH EVERY DAY AT NIGHT 90 tablet 3    ALPRAZolam (XANAX) 0.5 MG tablet Take 1 tablet (0.5 mg total) by mouth nightly as needed for Anxiety. 30 tablet 0    fluconazole (DIFLUCAN) 200 MG Tab Take 1 tablet (200 mg total) by mouth once. for 1 dose 1 tablet 0    gabapentin (NEURONTIN) 100 MG capsule Take 1 capsule (100 mg  total) by mouth 2 (two) times daily. 180 capsule 3    nitrofurantoin, macrocrystal-monohydrate, (MACROBID) 100 MG capsule Take 1 capsule (100 mg total) by mouth 2 (two) times daily. for 7 days 14 capsule 0     No current facility-administered medications for this visit.       Review of patient's allergies indicates:   Allergen Reactions    Gluten     Codeine Nausea Only    Sulfa (sulfonamide antibiotics) Nausea Only       Review of System:   General: no chills, fever, night sweats, weight gain or weight loss  Psychological: no depression or suicidal ideation  Breasts: no new or changing breast lumps, nipple discharge or masses.  Respiratory: no cough, shortness of breath, or wheezing  Cardiovascular: no chest pain or dyspnea on exertion  Gastrointestinal: no abdominal pain, change in bowel habits, or black or bloody stools  Genito-Urinary: as above  Musculoskeletal: no gait disturbance or muscular weakness     Appears well  VSS  Abd  soft No flank tenderness noted    I discussed UTI s/s and related hesitancy secondary to cystocele  Will treat empirically and check on pessary status  Answered all questions and will follow    I spent a total of 30 minutes on the day of the visit. This includes face to face time and non-face to face time preparing to see the patient (eg, review of tests), obtaining and/or reviewing separately obtained history, documenting clinical information in the electronic or other health record, independently interpreting results and communicating results to the patient/family/caregiver, or care coordinator.

## 2024-05-03 LAB
BACTERIA UR CULT: NORMAL
BACTERIA UR CULT: NORMAL

## 2024-05-06 ENCOUNTER — TELEPHONE (OUTPATIENT)
Dept: OBSTETRICS AND GYNECOLOGY | Facility: CLINIC | Age: 74
End: 2024-05-06
Payer: MEDICARE

## 2024-05-06 NOTE — TELEPHONE ENCOUNTER
----- Message from Devan Reyezne sent at 5/6/2024  1:02 PM CDT -----  Type: Needs Medical Advice  Who Called:  pt  Pharmacy name and phone #:    [id number] [address] [address]  Phone: [phone] Fax: [phone]  Best Call Back Number: [phone]  Additional Information:pt is calling the office to speak with the nurse in regards to getting a new antibiotic called in as she is having an allergic reaction to the one she is currently taking. She will also need something called in to help with her getting yeast infections from antibiotics. Please call back to advise. Thanks!

## 2024-05-06 NOTE — TELEPHONE ENCOUNTER
Pt states she is not comfortable staying on that medication. She is having more than just the itching and is too concerned to continue. She ask that you change the antibiotic

## 2024-05-06 NOTE — TELEPHONE ENCOUNTER
Pt is on macrobid.   She started having reaction Saturday.   She is itching, right side pain, gi upset., sluggish      Also would like something for yeast as well.

## 2024-05-15 ENCOUNTER — TELEPHONE (OUTPATIENT)
Dept: OBSTETRICS AND GYNECOLOGY | Facility: CLINIC | Age: 74
End: 2024-05-15
Payer: MEDICARE

## 2024-05-15 NOTE — TELEPHONE ENCOUNTER
----- Message from Alysa Pretty LPN sent at 5/15/2024  2:03 PM CDT -----  Have not rec'd no eta at this time  ----- Message -----  From: Joi Hunter LPN  Sent: 5/15/2024   2:01 PM CDT  To: Alysa Pretty LPN      ----- Message -----  From: Haven De Leon  Sent: 5/15/2024   1:56 PM CDT  To: [facility] Staff    Type:  Needs Medical Advice    Who Called:  Pt    Would the patient rather a call back or a response via [facility]?  Call back    Best Call Back Number:  [phone]    Additional Information:  Pt is calling to get an update on the pesuri and if it has come in yet.  Please call back to advise. Thanks!

## 2024-05-15 NOTE — TELEPHONE ENCOUNTER
Pt notified per larry pessary has not arrived and no known eta for it to arrive.  She wants to know why we cannot get the pessary in? It has been 2 months now.   What else can be done to help her while we wait? She states she keeps getting UTIs and she is sure it is because of not having the pessary

## 2024-05-16 ENCOUNTER — OFFICE VISIT (OUTPATIENT)
Dept: OBSTETRICS AND GYNECOLOGY | Facility: CLINIC | Age: 74
End: 2024-05-16
Payer: MEDICARE

## 2024-05-16 VITALS
DIASTOLIC BLOOD PRESSURE: 80 MMHG | BODY MASS INDEX: 24.96 KG/M2 | SYSTOLIC BLOOD PRESSURE: 116 MMHG | WEIGHT: 159.38 LBS

## 2024-05-16 DIAGNOSIS — N81.6 PELVIC ORGAN PROLAPSE QUANTIFICATION STAGE 1 RECTOCELE: Primary | ICD-10-CM

## 2024-05-16 PROCEDURE — 99999 PR PBB SHADOW E&M-EST. PATIENT-LVL V: CPT | Mod: PBBFAC,,, | Performed by: SPECIALIST

## 2024-05-16 PROCEDURE — 57160 INSERT PESSARY/OTHER DEVICE: CPT | Mod: PBBFAC,PN | Performed by: SPECIALIST

## 2024-05-16 PROCEDURE — 99215 OFFICE O/P EST HI 40 MIN: CPT | Mod: PBBFAC,PN | Performed by: SPECIALIST

## 2024-05-16 PROCEDURE — 57160 INSERT PESSARY/OTHER DEVICE: CPT | Mod: S$PBB,,, | Performed by: SPECIALIST

## 2024-05-16 PROCEDURE — 99213 OFFICE O/P EST LOW 20 MIN: CPT | Mod: S$PBB,25,, | Performed by: SPECIALIST

## 2024-05-16 NOTE — PROGRESS NOTES
72 yo WF followed for Grade 3 midline cystocele and POP 1 cervicouterine prolpase  returns for 3cm Gelhorn pessary fitting  Past Medical History:   Diagnosis Date    Depression     Fibrocystic breast     GERD (gastroesophageal reflux disease)     Goiter     Hyperlipidemia     Hypertension     Hypothyroidism        Past Surgical History:   Procedure Laterality Date    BILATERAL TUBAL LIGATION      CHOLECYSTECTOMY      THUMB ARTHROSCOPY         Family History   Problem Relation Name Age of Onset    Heart disease Father      Cancer Father      Kidney cancer Father      Arthritis Mother      Diabetes Brother      No Known Problems Sister      Breast cancer Neg Hx      Ovarian cancer Neg Hx      Colon cancer Neg Hx      Cervical cancer Neg Hx      Uterine cancer Neg Hx         Social History     Socioeconomic History    Marital status:     Number of children: 2   Occupational History    Occupation: housewife   Tobacco Use    Smoking status: Never    Smokeless tobacco: Never   Substance and Sexual Activity    Alcohol use: No    Drug use: No    Sexual activity: Not Currently     Birth control/protection: Post-menopausal       Current Outpatient Medications   Medication Sig Dispense Refill    ALPRAZolam (XANAX) 0.5 MG tablet Take 1 tablet (0.5 mg total) by mouth nightly as needed for Anxiety. 30 tablet 0    aspirin (ECOTRIN) 81 MG EC tablet Take 81 mg by mouth once daily.      atorvastatin (LIPITOR) 10 MG tablet Take 10 mg by mouth once daily.      B6-levomefolate-B12-D3-ALA (EB-N5 DR) 35 mg-3 mg- 2 mg-62.5 mcg CpDR Take 1 capsule by mouth Daily.      BENICAR 20 mg tablet Take 20 mg by mouth once daily.  3    buPROPion (WELLBUTRIN) 100 MG tablet TAKE 1 TABLET (100 MG TOTAL) BY MOUTH 3 (THREE) TIMES DAILY. 270 tablet 1    cholecalciferol, vitamin D3, (VITAMIN D3) 50 mcg (2,000 unit) Cap Take 1 capsule by mouth once daily.      cyanocobalamin-cobamamide 5,000-100 mcg Subl Place under the tongue.       diphenoxylate-atropine 2.5-0.025 mg (LOMOTIL) 2.5-0.025 mg per tablet TAKE 1 TABLET BY MOUTH THREE TIMES A DAY AS NEEDED DIARRHEA 30 tablet 1    folic acid/multivit-min/lutein (CENTRUM SILVER ORAL) Take 1 capsule by mouth once daily.      HYDROcodone-acetaminophen (NORCO) 5-325 mg per tablet Take 1 tablet by mouth every 6 (six) hours as needed for Pain. 15 tablet 0    Lactobacillus rhamnosus GG (CULTURELLE) 10 billion cell capsule Take 1 capsule by mouth once daily.      levocetirizine (XYZAL) 5 MG tablet Take 1 tablet (5 mg total) by mouth every evening. 90 tablet 3    levothyroxine (LEVOXYL) 25 MCG tablet One daily 30 tablet 2    levothyroxine (LEVOXYL) 25 MCG tablet One daily 90 tablet 0    levothyroxine (SYNTHROID) 25 MCG tablet Take 1 tablet (25 mcg total) by mouth before breakfast. 90 tablet 1    metoprolol succinate (TOPROL-XL) 50 MG 24 hr tablet TAKE 1/2 TABLET BY MOUTH TWICE A DAY. NEW DIRECTIONS 90 tablet 4    ondansetron (ZOFRAN-ODT) 4 MG TbDL Take 1-2 tablets (4-8 mg total) by mouth every 8 (eight) hours as needed. 15 tablet 0    potassium 99 mg Tab Take 1 tablet by mouth once.      simvastatin (ZOCOR) 20 MG tablet Take 20 mg by mouth every evening.      terbinafine HCL (LAMISIL) 250 mg tablet Take 250 mg by mouth once daily.      thyroid, pork, (ARMOUR THYROID) 30 mg Tab Take 1 tablet (30 mg total) by mouth before breakfast. 30 tablet 1    topiramate (TOPAMAX) 25 MG tablet TAKE 1 TABLET BY MOUTH EVERY DAY AT NIGHT 90 tablet 3    dicyclomine (BENTYL) 20 mg tablet Take 20 mg by mouth every 6 (six) hours. (Patient not taking: Reported on 5/16/2024)      esomeprazole magnesium (NEXIUM ORAL) Take 20 mg by mouth once daily.  (Patient not taking: Reported on 5/16/2024)      gabapentin (NEURONTIN) 100 MG capsule Take 1 capsule (100 mg total) by mouth 2 (two) times daily. 180 capsule 3    phenazopyridine (PYRIDIUM) 200 MG tablet Take 200 mg by mouth 3 (three) times daily. (Patient not taking: Reported on  5/16/2024)       No current facility-administered medications for this visit.       Review of patient's allergies indicates:   Allergen Reactions    Gluten     Macrobid [nitrofurantoin monohyd/m-cryst] Itching and Other (See Comments)     Pain  Sluggish    Codeine Nausea Only    Sulfa (sulfonamide antibiotics) Nausea Only       Review of System:   General: no chills, fever, night sweats, weight gain or weight loss  Psychological: no depression or suicidal ideation  Breasts: no new or changing breast lumps, nipple discharge or masses.  Respiratory: no cough, shortness of breath, or wheezing  Cardiovascular: no chest pain or dyspnea on exertion  Gastrointestinal: no abdominal pain, change in bowel habits, or black or bloody stools  Genito-Urinary: pelvic pressure , PROSPER PP  Musculoskeletal: no gait disturbance or muscular weakness     Pessary fitting trial     Digital forefinger measurement sacral spine 5cm 6cm pubic rami with  2 3/4cm Gelhorne placed high at the cervix  Pt ambulated and supine Valsalva with good anterior support No decent    Ref [id number]/3/4  Lot [id number] 2/28/25       RTO 4 months for pessary maintience    I spent a total of 30 minutes on the day of the visit. This includes face to face time and non-face to face time preparing to see the patient (eg, review of tests), obtaining and/or reviewing separately obtained history, documenting clinical information in the electronic or other health record, independently interpreting results and communicating results to the patient/family/caregiver, or care coordinator.

## 2024-09-25 ENCOUNTER — TELEPHONE (OUTPATIENT)
Dept: OBSTETRICS AND GYNECOLOGY | Facility: CLINIC | Age: 74
End: 2024-09-25
Payer: MEDICARE

## 2024-10-02 ENCOUNTER — OFFICE VISIT (OUTPATIENT)
Dept: OBSTETRICS AND GYNECOLOGY | Facility: CLINIC | Age: 74
End: 2024-10-02
Payer: MEDICARE

## 2024-10-02 VITALS
SYSTOLIC BLOOD PRESSURE: 110 MMHG | DIASTOLIC BLOOD PRESSURE: 82 MMHG | BODY MASS INDEX: 24.17 KG/M2 | WEIGHT: 154.31 LBS

## 2024-10-02 DIAGNOSIS — N81.6 PELVIC ORGAN PROLAPSE QUANTIFICATION STAGE 1 RECTOCELE: Primary | ICD-10-CM

## 2024-10-02 DIAGNOSIS — N81.4 CYSTOCELE WITH UTERINE PROLAPSE: ICD-10-CM

## 2024-10-02 PROCEDURE — 99214 OFFICE O/P EST MOD 30 MIN: CPT | Mod: PBBFAC,PN | Performed by: SPECIALIST

## 2024-10-02 PROCEDURE — 57160 INSERT PESSARY/OTHER DEVICE: CPT | Mod: PBBFAC,PN | Performed by: SPECIALIST

## 2024-10-02 PROCEDURE — 99999 PR PBB SHADOW E&M-EST. PATIENT-LVL IV: CPT | Mod: PBBFAC,,, | Performed by: SPECIALIST

## 2024-10-02 NOTE — PROGRESS NOTES
75 yo WF Grade 3 midline cystocele returns for follow up Pt fitted with Gelhorn pessary 4 months ago Today Pt denies pain, vaginal d/c, PROSPER and states she has not had return of UTI s/s nor overt infection since pessary placement  Past Medical History:   Diagnosis Date    Depression     Fibrocystic breast     GERD (gastroesophageal reflux disease)     Goiter     Hyperlipidemia     Hypertension     Hypothyroidism        Past Surgical History:   Procedure Laterality Date    BILATERAL TUBAL LIGATION      CHOLECYSTECTOMY      THUMB ARTHROSCOPY         Family History   Problem Relation Name Age of Onset    Heart disease Father      Cancer Father      Kidney cancer Father      Arthritis Mother      Diabetes Brother      No Known Problems Sister      Breast cancer Neg Hx      Ovarian cancer Neg Hx      Colon cancer Neg Hx      Cervical cancer Neg Hx      Uterine cancer Neg Hx         Social History     Socioeconomic History    Marital status:     Number of children: 2   Occupational History    Occupation: housewife   Tobacco Use    Smoking status: Never    Smokeless tobacco: Never   Substance and Sexual Activity    Alcohol use: No    Drug use: No    Sexual activity: Not Currently     Birth control/protection: Post-menopausal       Current Outpatient Medications   Medication Sig Dispense Refill    aspirin (ECOTRIN) 81 MG EC tablet Take 81 mg by mouth once daily.      atorvastatin (LIPITOR) 10 MG tablet Take 10 mg by mouth once daily.      B6-levomefolate-B12-D3-ALA (EB-N5 DR) 35 mg-3 mg- 2 mg-62.5 mcg CpDR Take 1 capsule by mouth Daily.      BENICAR 20 mg tablet Take 20 mg by mouth once daily.  3    buPROPion (WELLBUTRIN) 100 MG tablet TAKE 1 TABLET (100 MG TOTAL) BY MOUTH 3 (THREE) TIMES DAILY. 270 tablet 1    cholecalciferol, vitamin D3, (VITAMIN D3) 50 mcg (2,000 unit) Cap Take 1 capsule by mouth once daily.      cyanocobalamin-cobamamide 5,000-100 mcg Subl Place under the tongue.      diphenoxylate-atropine  2.5-0.025 mg (LOMOTIL) 2.5-0.025 mg per tablet TAKE 1 TABLET BY MOUTH THREE TIMES A DAY AS NEEDED DIARRHEA 30 tablet 1    folic acid/multivit-min/lutein (CENTRUM SILVER ORAL) Take 1 capsule by mouth once daily.      HYDROcodone-acetaminophen (NORCO) 5-325 mg per tablet Take 1 tablet by mouth every 6 (six) hours as needed for Pain. 15 tablet 0    Lactobacillus rhamnosus GG (CULTURELLE) 10 billion cell capsule Take 1 capsule by mouth once daily.      levocetirizine (XYZAL) 5 MG tablet Take 1 tablet (5 mg total) by mouth every evening. 90 tablet 3    levothyroxine (LEVOXYL) 25 MCG tablet One daily 30 tablet 2    levothyroxine (LEVOXYL) 25 MCG tablet One daily 90 tablet 0    levothyroxine (SYNTHROID) 25 MCG tablet Take 1 tablet (25 mcg total) by mouth before breakfast. 90 tablet 1    metoprolol succinate (TOPROL-XL) 50 MG 24 hr tablet TAKE 1/2 TABLET BY MOUTH TWICE A DAY. NEW DIRECTIONS 90 tablet 4    ondansetron (ZOFRAN-ODT) 4 MG TbDL Take 1-2 tablets (4-8 mg total) by mouth every 8 (eight) hours as needed. 15 tablet 0    potassium 99 mg Tab Take 1 tablet by mouth once.      simvastatin (ZOCOR) 20 MG tablet Take 20 mg by mouth every evening.      terbinafine HCL (LAMISIL) 250 mg tablet Take 250 mg by mouth once daily.      thyroid, pork, (ARMOUR THYROID) 30 mg Tab Take 1 tablet (30 mg total) by mouth before breakfast. 30 tablet 1    topiramate (TOPAMAX) 25 MG tablet TAKE 1 TABLET BY MOUTH EVERY DAY AT NIGHT 90 tablet 3    ALPRAZolam (XANAX) 0.5 MG tablet Take 1 tablet (0.5 mg total) by mouth nightly as needed for Anxiety. 30 tablet 0    dicyclomine (BENTYL) 20 mg tablet Take 20 mg by mouth every 6 (six) hours. (Patient not taking: Reported on 10/2/2024)      esomeprazole magnesium (NEXIUM ORAL) Take 20 mg by mouth once daily.  (Patient not taking: Reported on 10/2/2024)      gabapentin (NEURONTIN) 100 MG capsule Take 1 capsule (100 mg total) by mouth 2 (two) times daily. 180 capsule 3    phenazopyridine (PYRIDIUM) 200  MG tablet Take 200 mg by mouth 3 (three) times daily. (Patient not taking: Reported on 10/2/2024)       No current facility-administered medications for this visit.       Review of patient's allergies indicates:   Allergen Reactions    Gluten     Macrobid [nitrofurantoin monohyd/m-cryst] Itching and Other (See Comments)     Pain  Sluggish    Codeine Nausea Only    Sulfa (sulfonamide antibiotics) Nausea Only       Review of System:   General: no chills, fever, night sweats, weight gain or weight loss  Psychological: no depression or suicidal ideation  Breasts: no new or changing breast lumps, nipple discharge or masses.  Respiratory: no cough, shortness of breath, or wheezing  Cardiovascular: no chest pain or dyspnea on exertion  Gastrointestinal: no abdominal pain, change in bowel habits, or black or bloody stools  Genito-Urinary: no incontinence, urinary frequency/urgency or , pelvic pain or abnormal vaginal bleeding.  Musculoskeletal: no gait disturbance or muscular weakness       Expand All Collapse All    74 yo WF followed for Grade 3 midline cystocele and POP 1 cervicouterine prolpase  returns for 3cm Gelhorn pessary fitting       Past Medical History:   Diagnosis Date    Depression      Fibrocystic breast      GERD (gastroesophageal reflux disease)      Goiter      Hyperlipidemia      Hypertension      Hypothyroidism                 Past Surgical History:   Procedure Laterality Date    BILATERAL TUBAL LIGATION        CHOLECYSTECTOMY        THUMB ARTHROSCOPY                    Family History   Problem Relation Name Age of Onset    Heart disease Father        Cancer Father        Kidney cancer Father        Arthritis Mother        Diabetes Brother        No Known Problems Sister        Breast cancer Neg Hx        Ovarian cancer Neg Hx        Colon cancer Neg Hx        Cervical cancer Neg Hx        Uterine cancer Neg Hx             Social History            Socioeconomic History    Marital status:      Number of children: 2   Occupational History    Occupation: housewife   Tobacco Use    Smoking status: Never    Smokeless tobacco: Never   Substance and Sexual Activity    Alcohol use: No    Drug use: No    Sexual activity: Not Currently       Birth control/protection: Post-menopausal         Current Medications          Current Outpatient Medications   Medication Sig Dispense Refill    ALPRAZolam (XANAX) 0.5 MG tablet Take 1 tablet (0.5 mg total) by mouth nightly as needed for Anxiety. 30 tablet 0    aspirin (ECOTRIN) 81 MG EC tablet Take 81 mg by mouth once daily.        atorvastatin (LIPITOR) 10 MG tablet Take 10 mg by mouth once daily.        B6-levomefolate-B12-D3-ALA (EB-N5 DR) 35 mg-3 mg- 2 mg-62.5 mcg CpDR Take 1 capsule by mouth Daily.        BENICAR 20 mg tablet Take 20 mg by mouth once daily.   3    buPROPion (WELLBUTRIN) 100 MG tablet TAKE 1 TABLET (100 MG TOTAL) BY MOUTH 3 (THREE) TIMES DAILY. 270 tablet 1    cholecalciferol, vitamin D3, (VITAMIN D3) 50 mcg (2,000 unit) Cap Take 1 capsule by mouth once daily.        cyanocobalamin-cobamamide 5,000-100 mcg Subl Place under the tongue.        diphenoxylate-atropine 2.5-0.025 mg (LOMOTIL) 2.5-0.025 mg per tablet TAKE 1 TABLET BY MOUTH THREE TIMES A DAY AS NEEDED DIARRHEA 30 tablet 1    folic acid/multivit-min/lutein (CENTRUM SILVER ORAL) Take 1 capsule by mouth once daily.        HYDROcodone-acetaminophen (NORCO) 5-325 mg per tablet Take 1 tablet by mouth every 6 (six) hours as needed for Pain. 15 tablet 0    Lactobacillus rhamnosus GG (CULTURELLE) 10 billion cell capsule Take 1 capsule by mouth once daily.        levocetirizine (XYZAL) 5 MG tablet Take 1 tablet (5 mg total) by mouth every evening. 90 tablet 3    levothyroxine (LEVOXYL) 25 MCG tablet One daily 30 tablet 2    levothyroxine (LEVOXYL) 25 MCG tablet One daily 90 tablet 0    levothyroxine (SYNTHROID) 25 MCG tablet Take 1 tablet (25 mcg total) by mouth before breakfast. 90 tablet 1    metoprolol  succinate (TOPROL-XL) 50 MG 24 hr tablet TAKE 1/2 TABLET BY MOUTH TWICE A DAY. NEW DIRECTIONS 90 tablet 4    ondansetron (ZOFRAN-ODT) 4 MG TbDL Take 1-2 tablets (4-8 mg total) by mouth every 8 (eight) hours as needed. 15 tablet 0    potassium 99 mg Tab Take 1 tablet by mouth once.        simvastatin (ZOCOR) 20 MG tablet Take 20 mg by mouth every evening.        terbinafine HCL (LAMISIL) 250 mg tablet Take 250 mg by mouth once daily.        thyroid, pork, (ARMOUR THYROID) 30 mg Tab Take 1 tablet (30 mg total) by mouth before breakfast. 30 tablet 1    topiramate (TOPAMAX) 25 MG tablet TAKE 1 TABLET BY MOUTH EVERY DAY AT NIGHT 90 tablet 3    dicyclomine (BENTYL) 20 mg tablet Take 20 mg by mouth every 6 (six) hours. (Patient not taking: Reported on 5/16/2024)        esomeprazole magnesium (NEXIUM ORAL) Take 20 mg by mouth once daily.  (Patient not taking: Reported on 5/16/2024)        gabapentin (NEURONTIN) 100 MG capsule Take 1 capsule (100 mg total) by mouth 2 (two) times daily. 180 capsule 3    phenazopyridine (PYRIDIUM) 200 MG tablet Take 200 mg by mouth 3 (three) times daily. (Patient not taking: Reported on 5/16/2024)          No current facility-administered medications for this visit.                  Review of patient's allergies indicates:   Allergen Reactions    Gluten      Macrobid [nitrofurantoin monohyd/m-cryst] Itching and Other (See Comments)       Pain  Sluggish    Codeine Nausea Only    Sulfa (sulfonamide antibiotics) Nausea Only         Review of System:   General: no chills, fever, night sweats, weight gain or weight loss  Psychological: no depression or suicidal ideation  Breasts: no new or changing breast lumps, nipple discharge or masses.  Respiratory: no cough, shortness of breath, or wheezing  Cardiovascular: no chest pain or dyspnea on exertion  Gastrointestinal: no abdominal pain, change in bowel habits, or black or bloody stools  Genito-Urinary: pelvic pressure , PROSPER PP  Musculoskeletal: no  gait disturbance or muscular weakness     EXAM    As above  Pessary grasped with ring clamp and removed without difficulty     Pessary fitting trial     Digital forefinger measurement sacral spine 5cm 6cm pubic rami with  2 3/4cm Gelhorne placed high at the cervix  Pt ambulated and supine Valsalva with good anterior support No decent     Ref MXKPGE2/3/4  Lot [id number] 2/28/25        RTO 4 months for pessary maintience     I spent a total of 30 minutes on the day of the visit. This includes face to face time and non-face to face time preparing to see the patient (eg, review of tests), obtaining and/or reviewing separately obtained history, documenting clinical information in the electronic or other health record, independently interpreting results and communicating results to the patient/family/caregiver, or care coordinator.

## 2024-10-04 DIAGNOSIS — G43.709 CHRONIC MIGRAINE W/O AURA, NOT INTRACTABLE, W/O STAT MIGR: Primary | ICD-10-CM

## 2024-10-07 NOTE — PROGRESS NOTES
Neurology [facility]ache [facility] - [facility]  New Patient Visit     Subjective      REFERRAL SOURCE  Self, Aaareferral          CHIEF COMPLAINT/REASON FOR REFERRAL  Headache      HISTORY OF PRESENT ILLNESS  Carole Sarabia is a 74 y.o. woman with depression/anxiety, HLD, hypertension who is presenting to the [facility] for an initial office visit with a chief complaint of headache.     The patient states that they began to experience headaches around her early 20's with her menstrual period. She would have occasional headaches also outside of her period if she had stressful situations. She noticed that her headache was trigger with light, sound, etc. She started Imitrex at some point in her 30's which she was told made her blood pressure too high. Topiramate was started at some point that was somewhat helpful.     She completed menopause in her 50's and noticed her headaches improved. Headache continued to be well-controlled until 2024. At that time, she states that she started to experience headache on the left side. She was checking on a cake in her kitchen and stooped low to look in the oven. She when she stood up she felt a sharp pain on the right. She stood up and then felt that the pain was quite severe for 10 min. She sat down and took APAP and while it improved she noticed that headache would worsen with certain positions. She noticed that whenever she would look at her phone her headache worsened. She has had to turn her light down on her phone.     She says that her pain has improved over the two months with lifestyle adaptations.    Otherwise, the patient experienced a fall in  or  that resulted in head injury after losing her balance. She was told that she did not have a concussion.       Current headache characteristics:  Headache Frequency:        Total: 3        Disablin     Duration of attacks: seconds minutes hours days: hours intially but now relatively  "headache free  Timing to max pain: seconds minutes hours: seconds for first headache in 8/2024 but headaches improved since then  Headache location:    right headache location: temporal   left headache location: frontal  Quality: headache quality: throbbing / pulsating and sharp/shooting/stabbing; thunderclap for right sided headache  Intensity: mild moderate moderate-to-severe severe: moderate to severe and severe  Associated symptoms: headache associated sx: photophobia, phonophobia, osmophobia, aggravation with routine physical activity, and nausea  Unilateral cranial autonomic features or restlessness:   Premonitory symptoms:   Aura symptoms:   Type: visual   Duration: migraine aura duration: 5 minutes - 60 minutes  Headache Red Flags:        Fevers/Chills/Unintended Weight Loss: no        Focal weakness: no        Thunderclap onset: yes - right temporal        Start a headache with coughing/sneezing/bending over: no        Headache absolutely worse with certain positions (lying down vs standing up): yes - worse with standing (first episode)        Double vision: no        Loss of color vision: no        Pulsatile or whooshing tinnitus: no  Triggers: Light / sound  Neck pain: no Radiation no  Jaw pain/cramping with chewing: no  Symptoms of dysautonomia: no     Prior non-pharm treatments (biofeedback, CBT, PT, chiropractor, acupuncture, massage): no  History of asthma, constipation, kidney stones: no  Psychiatric comorbidities: anxiety/depression  History of Head Trauma: yes - 2022 or 2023  Hypertension, Hyperlipidemia: yes - both  Prior stroke: "light stroke" - many years ago. Speech was off for a couple of weeks  Coronary artery disease: no  Vascular malformation or aneurysm: no  Caffeine use: yes - nearly every 1 cup  Sleep comorbidities: Snoring present, witnessed apneas absent, sleep study no     Family history of headaches:  yes - great grandmother had headache that was very bad; son had headache     Last " dilated eye exam: within the last 6 months Any abnormalities? yes - checking for glaucoma     Menstrual status:  Did attacks start around menarche? yes  Is the patient menopausal? Using HRT? no     SOCIAL HISTORY  The patient currently lives in [place], [place]. They are able to perform all ADLs without assistance. They are not working currently - previously in the [employer]. They currently smoke 0 packs per day. They drink 0 EtOH containing beverages per day. No illicits.      ----------------     DIAGNOSTIC DATA     Laboratory Data:   None recent     Imaging Data (I have personally reviewed the imaging below):   No neuroimaging     ----------------     Current Acute Headache Medications:  -- APAP 500 mg x 2 when headache worsened     Number of Days with acute medication use per week: 0-1     Current Prophylactic Headache Medications:  --topiramate 25 mg PO QHS  --metoprolol 25 mg PO QD  -- Bupropion 100 mg PO BID  -- Gabapentin 200 mg PO QHS     Previous headache treatment that was ineffective or not tolerated:  Acute: Norco  Preventive:  Devices: None    Procedures: None    ----------------     Past Medical History:   Diagnosis Date    Depression     Fibrocystic breast     GERD (gastroesophageal reflux disease)     Goiter     Hyperlipidemia     Hypertension     Hypothyroidism         Current Outpatient Medications on File Prior to Visit   Medication Sig Dispense Refill    aspirin (ECOTRIN) 81 MG EC tablet Take 81 mg by mouth once daily.      atorvastatin (LIPITOR) 10 MG tablet Take 10 mg by mouth once daily.      B6-levomefolate-B12-D3-ALA (EB-N5 DR) 35 mg-3 mg- 2 mg-62.5 mcg CpDR Take 1 capsule by mouth Daily.      BENICAR 20 mg tablet Take 20 mg by mouth once daily.  3    buPROPion (WELLBUTRIN) 100 MG tablet TAKE 1 TABLET (100 MG TOTAL) BY MOUTH 3 (THREE) TIMES DAILY. 270 tablet 1    cholecalciferol, vitamin D3, (VITAMIN D3) 50 mcg (2,000 unit) Cap Take 1 capsule by mouth once daily.      cyanocobalamin-cobamamide  5,000-100 mcg Subl Place under the tongue.      dicyclomine (BENTYL) 20 mg tablet Take 20 mg by mouth every 6 (six) hours.      diphenoxylate-atropine 2.5-0.025 mg (LOMOTIL) 2.5-0.025 mg per tablet TAKE 1 TABLET BY MOUTH THREE TIMES A DAY AS NEEDED DIARRHEA 30 tablet 1    esomeprazole magnesium (NEXIUM ORAL) Take 20 mg by mouth once daily.      folic acid/multivit-min/lutein (CENTRUM SILVER ORAL) Take 1 capsule by mouth once daily.      Lactobacillus rhamnosus GG (CULTURELLE) 10 billion cell capsule Take 1 capsule by mouth once daily.      levocetirizine (XYZAL) 5 MG tablet Take 1 tablet (5 mg total) by mouth every evening. 90 tablet 3    levothyroxine (LEVOXYL) 25 MCG tablet One daily 30 tablet 2    levothyroxine (LEVOXYL) 25 MCG tablet One daily 90 tablet 0    levothyroxine (SYNTHROID) 25 MCG tablet Take 1 tablet (25 mcg total) by mouth before breakfast. 90 tablet 1    metoprolol succinate (TOPROL-XL) 50 MG 24 hr tablet TAKE 1/2 TABLET BY MOUTH TWICE A DAY. NEW DIRECTIONS 90 tablet 4    ondansetron (ZOFRAN-ODT) 4 MG TbDL Take 1-2 tablets (4-8 mg total) by mouth every 8 (eight) hours as needed. 15 tablet 0    phenazopyridine (PYRIDIUM) 200 MG tablet Take 200 mg by mouth 3 (three) times daily.      potassium 99 mg Tab Take 1 tablet by mouth once.      simvastatin (ZOCOR) 20 MG tablet Take 20 mg by mouth every evening.      terbinafine HCL (LAMISIL) 250 mg tablet Take 250 mg by mouth once daily.      thyroid, pork, (ARMOUR THYROID) 30 mg Tab Take 1 tablet (30 mg total) by mouth before breakfast. 30 tablet 1    topiramate (TOPAMAX) 25 MG tablet TAKE 1 TABLET BY MOUTH EVERY DAY AT NIGHT 90 tablet 3    [DISCONTINUED] HYDROcodone-acetaminophen (NORCO) 5-325 mg per tablet Take 1 tablet by mouth every 6 (six) hours as needed for Pain. 15 tablet 0    ALPRAZolam (XANAX) 0.5 MG tablet Take 1 tablet (0.5 mg total) by mouth nightly as needed for Anxiety. 30 tablet 0    clonazePAM (KLONOPIN) 1 MG tablet Take 2 mg by mouth 3  "(three) times daily as needed for Anxiety.      gabapentin (NEURONTIN) 100 MG capsule Take 1 capsule (100 mg total) by mouth 2 (two) times daily. 180 capsule 3     No current facility-administered medications on file prior to visit.        REVIEW OF SYSTEMS  No fevers, chills, or unintended weight loss.     Objective      PHYSICAL EXAMINATION    Blood pressure 122/70, pulse (!) 51, height 5' 7" (1.702 m), weight 70.9 kg (156 lb 4.9 oz).     General Exam: The patient is in no acute distress.  Psychiatric: The patient is alert, interactive, and has appropriate mood and affect.  HEENT:  Bilateral supraorbital > auriculotemporal, occipital notch tenderness.    Temporal arterial pulses are equal and nontender to palpation.    No cervical paraspinal or trapezius muscle tenderness.    Good passive and active range of motion of the neck.    No popping, clicking, or pain with jaw opening and closing.     Neurologic Examination:  Mental Status: Mental status is normal to conversation. The patient is able to recall the details of their medical history without difficulty. Speech is clear.  Language is grossly intact.    Cranial Nerves: Pupils are equal, round, and reactive to light.  Extraocular movements are intact. No nystagmus. Funduscopic examination reveals no evidence of papilledema. Facial symmetry and strength is intact. Facial sensation is intact and equal bilaterally. Tongue protrudes in the midline. Symmetrical palate elevation.  Motor Examination: Full strength, normal tone, normal muscle bulk in upper and lower extremities. No tremor.    Reflexes: Deep tendon reflexes are symmetric and 2+ throughout.    Sensory Examination:  Equal and intact to touch at the arms and legs.  Coordination: No dysmetria on finger-nose-finger  Gait: Normal gait. Tandem was performed without any difficulty.     Assessment & Plan      Carole Sarabia is a 74 y.o. woman with depression/anxiety, HLD, hypertension who is presenting to the " [facility] for an initial office visit with a chief complaint of headache.    Ms Sarabia states that she has a history of headache with migrainous features since her 20-30's. Headache was treated with topiramate and also Imitrex at one point. Pain was under fair control after menopause until 8/2024 when she was in the kitchen looking into her oven, then stood up quickly and experienced a right-sided sharp stabbing pain that was severe in intensity. Pain lasted for 10 minutes before improving and she was left with a lingering headache over the next month. Recently, headache has improved significantly but she kept her appointment because she was worried that her headache could be serious.     Exam was notable for tenderness overlying the bilateral supraorbital notches but temporal notches and occipital notches were under-whelming. Exam was non-focal. Given her well-appearing presentation (marked headache improvement) and lack of associated features with her sudden onset headache (thunderclap), we will defer sending her to the ED today. Instead, will order an expedited MRI brain / MRA head / MRA neck / MRV head to check for evidence of vascular malformaiton, dissection, aneurysm, or chronic thrombosis. Will check CBC/CMP/ESR/CRP though low likelihood for GCA given lack of typical symptoms.    #Thunderclap onset headache  #Migraine with visual aura      -- Diagnostic studies and referrals recommended:    -- MRI Brain w/wo contrast   -- MRA brain wo contrast   -- MRV brain wo contrast   -- MRA neck w/wo contrast  -- Preventive: None today given infrequent headaches  -- Supplements: Try Magnesium (oxide, glycinate, citrate, or combination) 400 mg by mouth twice per day (Side effect: stomach upset). Fine at local [employer] or [employer] food store. Try Riboflavin (VitB2) 200 mg by mouth twice per day (Side effect: bright yellow urine). Find at Health food store ([employer], etc.)  -- Abortive: To  be discussed after imaging  -- Nausea/Vomiting: None.  -- Future options: pending imaging results     -- Recommend lifestyle modifications including the SEEDS for success in headache management, including Sleep hygiene, Exercising regularly, Eating healthy and regular meals, Drinking water and maintaining a headache Diary, and Stress reduction.  -- Therapeutic education and advocacy:        -Access the Migraine Toolkit, [facility] Migraine Patient Toolkit        -Visit the [employer] Migraine [employer] (americanmigrainefoundation.org) website and the [employer] Migraine Facebook group for additional patient education     -- Follow-up recommended: 1 month     Sascha Urrutia MD  Headache and Pain Management  [facility]    Precharting time: 0 min  Visit time: 40 min  Post-visit time: 7 min  Total time: 47 min

## 2024-10-08 ENCOUNTER — LAB VISIT (OUTPATIENT)
Dept: LAB | Facility: HOSPITAL | Age: 74
End: 2024-10-08
Attending: INTERNAL MEDICINE
Payer: MEDICARE

## 2024-10-08 ENCOUNTER — OFFICE VISIT (OUTPATIENT)
Dept: NEUROLOGY | Facility: CLINIC | Age: 74
End: 2024-10-08
Payer: MEDICARE

## 2024-10-08 VITALS
DIASTOLIC BLOOD PRESSURE: 70 MMHG | SYSTOLIC BLOOD PRESSURE: 122 MMHG | BODY MASS INDEX: 24.53 KG/M2 | WEIGHT: 156.31 LBS | HEIGHT: 67 IN | HEART RATE: 51 BPM

## 2024-10-08 DIAGNOSIS — G43.109 MIGRAINE WITH AURA AND WITHOUT STATUS MIGRAINOSUS, NOT INTRACTABLE: ICD-10-CM

## 2024-10-08 DIAGNOSIS — G44.53 THUNDERCLAP HEADACHE: Primary | ICD-10-CM

## 2024-10-08 DIAGNOSIS — I67.89 OTHER CEREBROVASCULAR DISEASE: ICD-10-CM

## 2024-10-08 DIAGNOSIS — G44.53 THUNDERCLAP HEADACHE: ICD-10-CM

## 2024-10-08 DIAGNOSIS — I77.89 OTHER SPECIFIED DISORDERS OF ARTERIES AND ARTERIOLES: ICD-10-CM

## 2024-10-08 DIAGNOSIS — R29.818 OTHER SYMPTOMS AND SIGNS INVOLVING THE NERVOUS SYSTEM: ICD-10-CM

## 2024-10-08 LAB
ALBUMIN SERPL BCP-MCNC: 3.8 G/DL (ref 3.5–5.2)
ALP SERPL-CCNC: 95 U/L (ref 55–135)
ALT SERPL W/O P-5'-P-CCNC: 39 U/L (ref 10–44)
ANION GAP SERPL CALC-SCNC: 7 MMOL/L (ref 8–16)
AST SERPL-CCNC: 38 U/L (ref 10–40)
BILIRUB SERPL-MCNC: 0.3 MG/DL (ref 0.1–1)
BUN SERPL-MCNC: 22 MG/DL (ref 8–23)
CALCIUM SERPL-MCNC: 10.7 MG/DL (ref 8.7–10.5)
CHLORIDE SERPL-SCNC: 108 MMOL/L (ref 95–110)
CO2 SERPL-SCNC: 26 MMOL/L (ref 23–29)
CREAT SERPL-MCNC: 0.9 MG/DL (ref 0.5–1.4)
CRP SERPL-MCNC: 2.8 MG/L (ref 0–8.2)
ERYTHROCYTE [DISTWIDTH] IN BLOOD BY AUTOMATED COUNT: 12.6 % (ref 11.5–14.5)
ERYTHROCYTE [SEDIMENTATION RATE] IN BLOOD BY PHOTOMETRIC METHOD: <2 MM/HR (ref 0–36)
EST. GFR  (NO RACE VARIABLE): >60 ML/MIN/1.73 M^2
GLUCOSE SERPL-MCNC: 83 MG/DL (ref 70–110)
HCT VFR BLD AUTO: 45.2 % (ref 37–48.5)
HGB BLD-MCNC: 14.6 G/DL (ref 12–16)
MCH RBC QN AUTO: 32 PG (ref 27–31)
MCHC RBC AUTO-ENTMCNC: 32.3 G/DL (ref 32–36)
MCV RBC AUTO: 99 FL (ref 82–98)
PLATELET # BLD AUTO: 235 K/UL (ref 150–450)
PMV BLD AUTO: 10.6 FL (ref 9.2–12.9)
POTASSIUM SERPL-SCNC: 4.7 MMOL/L (ref 3.5–5.1)
PROT SERPL-MCNC: 6.9 G/DL (ref 6–8.4)
RBC # BLD AUTO: 4.56 M/UL (ref 4–5.4)
SODIUM SERPL-SCNC: 141 MMOL/L (ref 136–145)
WBC # BLD AUTO: 7.5 K/UL (ref 3.9–12.7)

## 2024-10-08 PROCEDURE — 85652 RBC SED RATE AUTOMATED: CPT | Performed by: INTERNAL MEDICINE

## 2024-10-08 PROCEDURE — 99999 PR PBB SHADOW E&M-EST. PATIENT-LVL V: CPT | Mod: PBBFAC,,, | Performed by: INTERNAL MEDICINE

## 2024-10-08 PROCEDURE — 86140 C-REACTIVE PROTEIN: CPT | Performed by: INTERNAL MEDICINE

## 2024-10-08 PROCEDURE — 80053 COMPREHEN METABOLIC PANEL: CPT | Performed by: INTERNAL MEDICINE

## 2024-10-08 PROCEDURE — 85027 COMPLETE CBC AUTOMATED: CPT | Performed by: INTERNAL MEDICINE

## 2024-10-08 PROCEDURE — 85651 RBC SED RATE NONAUTOMATED: CPT | Mod: PO | Performed by: INTERNAL MEDICINE

## 2024-10-08 PROCEDURE — 99215 OFFICE O/P EST HI 40 MIN: CPT | Mod: PBBFAC,PO | Performed by: INTERNAL MEDICINE

## 2024-10-08 PROCEDURE — 36415 COLL VENOUS BLD VENIPUNCTURE: CPT | Mod: PO | Performed by: INTERNAL MEDICINE

## 2024-10-08 RX ORDER — CLONAZEPAM 1 MG/1
2 TABLET ORAL 3 TIMES DAILY PRN
COMMUNITY

## 2024-10-25 ENCOUNTER — HOSPITAL ENCOUNTER (OUTPATIENT)
Dept: RADIOLOGY | Facility: HOSPITAL | Age: 74
Discharge: HOME | End: 2024-10-25
Attending: INTERNAL MEDICINE
Payer: MEDICARE

## 2024-10-25 DIAGNOSIS — R29.818 OTHER SYMPTOMS AND SIGNS INVOLVING THE NERVOUS SYSTEM: ICD-10-CM

## 2024-10-25 DIAGNOSIS — G44.53 THUNDERCLAP HEADACHE: ICD-10-CM

## 2024-10-25 DIAGNOSIS — I67.89 OTHER CEREBROVASCULAR DISEASE: ICD-10-CM

## 2024-10-25 DIAGNOSIS — I77.89 OTHER SPECIFIED DISORDERS OF ARTERIES AND ARTERIOLES: ICD-10-CM

## 2024-10-25 PROCEDURE — 70553 MRI BRAIN STEM W/O & W/DYE: CPT | Mod: TC,PO

## 2024-10-25 PROCEDURE — A9585 GADOBUTROL INJECTION: HCPCS | Mod: PO | Performed by: INTERNAL MEDICINE

## 2024-10-25 PROCEDURE — 70548 MR ANGIOGRAPHY NECK W/DYE: CPT | Mod: TC,PO

## 2024-10-25 PROCEDURE — 70544 MR ANGIOGRAPHY HEAD W/O DYE: CPT | Mod: TC,PO

## 2024-10-25 PROCEDURE — 25500020 PHARM REV CODE 255: Mod: PO | Performed by: INTERNAL MEDICINE

## 2024-10-25 RX ORDER — GADOBUTROL 604.72 MG/ML
7 INJECTION INTRAVENOUS
Status: COMPLETED | OUTPATIENT
Start: 2024-10-25 | End: 2024-10-25

## 2024-10-25 RX ADMIN — GADOBUTROL 7 ML: 604.72 INJECTION INTRAVENOUS at 03:10

## 2024-10-28 ENCOUNTER — PATIENT MESSAGE (OUTPATIENT)
Dept: NEUROLOGY | Facility: CLINIC | Age: 74
End: 2024-10-28
Payer: MEDICARE

## 2024-10-30 ENCOUNTER — TELEPHONE (OUTPATIENT)
Dept: PAIN MEDICINE | Facility: CLINIC | Age: 74
End: 2024-10-30
Payer: MEDICARE

## 2024-11-12 ENCOUNTER — TELEPHONE (OUTPATIENT)
Dept: PAIN MEDICINE | Facility: CLINIC | Age: 74
End: 2024-11-12
Payer: MEDICARE

## 2024-11-12 NOTE — TELEPHONE ENCOUNTER
----- Message from [place] sent at 11/11/2024 11:08 AM CST -----  Regarding: Needs Medical Advice  Contact: patient at [phone]  Type: Needs Medical Advice  Who Called:  patient at [phone]    Additional Information: patient thought today's appointment was going to be an audio call appt type, not in person. She would like to discuss the results of her MRI. Please call and advise. Thank you

## 2024-11-19 ENCOUNTER — OFFICE VISIT (OUTPATIENT)
Dept: NEUROLOGY | Facility: CLINIC | Age: 74
End: 2024-11-19
Payer: MEDICARE

## 2024-11-19 VITALS
HEART RATE: 68 BPM | DIASTOLIC BLOOD PRESSURE: 86 MMHG | HEIGHT: 67 IN | WEIGHT: 156.5 LBS | BODY MASS INDEX: 24.56 KG/M2 | SYSTOLIC BLOOD PRESSURE: 131 MMHG

## 2024-11-19 DIAGNOSIS — G62.9 NEUROPATHY: Primary | ICD-10-CM

## 2024-11-19 DIAGNOSIS — E53.8 DEFICIENCY OF OTHER SPECIFIED B GROUP VITAMINS: ICD-10-CM

## 2024-11-19 DIAGNOSIS — R73.03 PREDIABETES: ICD-10-CM

## 2024-11-19 DIAGNOSIS — G57.93 UNSPECIFIED MONONEUROPATHY OF BILATERAL LOWER LIMBS: ICD-10-CM

## 2024-11-19 DIAGNOSIS — M79.672 PAIN IN BOTH FEET: ICD-10-CM

## 2024-11-19 DIAGNOSIS — M79.671 PAIN IN BOTH FEET: ICD-10-CM

## 2024-11-19 PROCEDURE — 99999 PR PBB SHADOW E&M-EST. PATIENT-LVL V: CPT | Mod: PBBFAC,,, | Performed by: INTERNAL MEDICINE

## 2024-11-19 PROCEDURE — 99215 OFFICE O/P EST HI 40 MIN: CPT | Mod: PBBFAC,PO | Performed by: INTERNAL MEDICINE

## 2024-11-19 RX ORDER — LIDOCAINE AND PRILOCAINE 25; 25 MG/G; MG/G
CREAM TOPICAL
Qty: 25 G | Refills: 2 | Status: SHIPPED | OUTPATIENT
Start: 2024-11-19

## 2024-11-19 NOTE — PROGRESS NOTES
Neurology Headache Clinic - [facility]  Return Patient Visit     Subjective      REFERRAL SOURCE  No ref. provider found          CHIEF COMPLAINT/REASON FOR REFERRAL  Headache      HISTORY OF PRESENT ILLNESS  Carole Sarabia is a 74 y.o. woman with depression/anxiety, HLD, hypertension, prediabetes, who is presenting to the [facility] for an office visit with a chief complaint of headache.     The patient states that they began to experience headaches around her early 20's with her menstrual period. She would have occasional headaches also outside of her period if she had stressful situations. She noticed that her headache was trigger with light, sound, etc. She started Imitrex at some point in her 30's which she was told made her blood pressure too high. Topiramate was started at some point that was somewhat helpful.     She completed menopause in her 50's and noticed her headaches improved. Headache continued to be well-controlled until 2024. At that time, she states that she started to experience headache on the left side. She was checking on a cake in her kitchen and stooped low to look in the oven. She when she stood up she felt a sharp pain on the right. She stood up and then felt that the pain was quite severe for 10 min. She sat down and took APAP and while it improved she noticed that headache would worsen with certain positions. She noticed that whenever she would look at her phone her headache worsened. She has had to turn her light down on her phone.     She says that her pain has improved over the two months with lifestyle adaptations.    Otherwise, the patient experienced a fall in  or  that resulted in head injury after losing her balance. She was told that she did not have a concussion.       NPV headache characteristics:  Headache Frequency:        Total: 3        Disablin     Duration of attacks: seconds minutes hours days: hours intially but now  "relatively headache free  Timing to max pain: seconds minutes hours: seconds for first headache in 8/2024 but headaches improved since then  Headache location:    right headache location: temporal   left headache location: frontal  Quality: headache quality: throbbing / pulsating and sharp/shooting/stabbing; thunderclap for right sided headache  Intensity: mild moderate moderate-to-severe severe: moderate to severe and severe  Associated symptoms: headache associated sx: photophobia, phonophobia, osmophobia, aggravation with routine physical activity, and nausea  Unilateral cranial autonomic features or restlessness:   Premonitory symptoms:   Aura symptoms:   Type: visual   Duration: migraine aura duration: 5 minutes - 60 minutes  Headache Red Flags:        Fevers/Chills/Unintended Weight Loss: no        Focal weakness: no        Thunderclap onset: yes - right temporal        Start a headache with coughing/sneezing/bending over: no        Headache absolutely worse with certain positions (lying down vs standing up): yes - worse with standing (first episode)        Double vision: no        Loss of color vision: no        Pulsatile or whooshing tinnitus: no  Triggers: Light / sound  Neck pain: no Radiation no  Jaw pain/cramping with chewing: no  Symptoms of dysautonomia: no     Prior non-pharm treatments (biofeedback, CBT, PT, chiropractor, acupuncture, massage): no  History of asthma, constipation, kidney stones: no  Psychiatric comorbidities: anxiety/depression  History of Head Trauma: yes - 2022 or 2023  Hypertension, Hyperlipidemia: yes - both  Prior stroke: "light stroke" - many years ago. Speech was off for a couple of weeks  Coronary artery disease: no  Vascular malformation or aneurysm: no  Caffeine use: yes - nearly every 1 cup  Sleep comorbidities: Snoring present, witnessed apneas absent, sleep study no     Family history of headaches:  yes - great grandmother had headache that was very bad; son had " headache     Last dilated eye exam: within the last 6 months Any abnormalities? yes - checking for glaucoma     Menstrual status:  Did attacks start around menarche? yes  Is the patient menopausal? Using HRT? no     SOCIAL HISTORY  The patient currently lives in [place], [place]. They are able to perform all ADLs without assistance. They are not working currently - previously in the [employer]. They currently smoke 0 packs per day. They drink 0 EtOH containing beverages per day. No illicits.     Interval History  11/19/2024: Last seen in 10/2024 for NPV. At that time, assessment was that she has a background of migraine that was under relatively good control and then in August 2024 she stood up abruptly from her stove and felt sudden onset of right-sided head pain.  This headache persisted for about a month.  Plan was to obtain imaging and inflammatory markers, which was notable only for evidence for prior right caudate lacunar infarct. She will have only 8 mild headache days per month that are easily treated by acetaminophen.     Otherwise, the patient states since she has been having bothersome, burning pain in her feet.  She also has some restricted range of motion of her great toes bilaterally.  She is on gabapentin 100 mg that it makes her sedated.      ===      Current Acute Headache Medications:  -- APAP 500 mg x 2 when headache worsened - works     Number of Days with acute medication use per week: 0-1     Current Prophylactic Headache Medications:  -- topiramate 25 mg PO QHS  -- metoprolol 25 mg PO QD  -- Bupropion 100 mg PO BID  -- Gabapentin 100 mg PO QHS     Previous headache treatment that was ineffective or not tolerated:  Acute: Norco  Preventive:  Devices: None    Procedures: None    ----------------     Past Medical History:   Diagnosis Date    Depression     Fibrocystic breast     GERD (gastroesophageal reflux disease)     Goiter     Hyperlipidemia     Hypertension     Hypothyroidism         Current  Outpatient Medications on File Prior to Visit   Medication Sig Dispense Refill    aspirin (ECOTRIN) 81 MG EC tablet Take 81 mg by mouth once daily.      atorvastatin (LIPITOR) 10 MG tablet Take 10 mg by mouth once daily.      B6-levomefolate-B12-D3-ALA (EB-N5 DR) 35 mg-3 mg- 2 mg-62.5 mcg CpDR Take 1 capsule by mouth Daily.      BENICAR 20 mg tablet Take 20 mg by mouth once daily.  3    buPROPion (WELLBUTRIN) 100 MG tablet TAKE 1 TABLET (100 MG TOTAL) BY MOUTH 3 (THREE) TIMES DAILY. 270 tablet 1    cholecalciferol, vitamin D3, (VITAMIN D3) 50 mcg (2,000 unit) Cap Take 1 capsule by mouth once daily.      clonazePAM (KLONOPIN) 1 MG tablet Take 2 mg by mouth 3 (three) times daily as needed for Anxiety.      cyanocobalamin-cobamamide 5,000-100 mcg Subl Place under the tongue.      dicyclomine (BENTYL) 20 mg tablet Take 20 mg by mouth every 6 (six) hours.      diphenoxylate-atropine 2.5-0.025 mg (LOMOTIL) 2.5-0.025 mg per tablet TAKE 1 TABLET BY MOUTH THREE TIMES A DAY AS NEEDED DIARRHEA 30 tablet 1    esomeprazole magnesium (NEXIUM ORAL) Take 20 mg by mouth once daily.      folic acid/multivit-min/lutein (CENTRUM SILVER ORAL) Take 1 capsule by mouth once daily.      Lactobacillus rhamnosus GG (CULTURELLE) 10 billion cell capsule Take 1 capsule by mouth once daily.      levocetirizine (XYZAL) 5 MG tablet Take 1 tablet (5 mg total) by mouth every evening. 90 tablet 3    levothyroxine (LEVOXYL) 25 MCG tablet One daily 30 tablet 2    levothyroxine (LEVOXYL) 25 MCG tablet One daily 90 tablet 0    levothyroxine (SYNTHROID) 25 MCG tablet Take 1 tablet (25 mcg total) by mouth before breakfast. 90 tablet 1    metoprolol succinate (TOPROL-XL) 50 MG 24 hr tablet TAKE 1/2 TABLET BY MOUTH TWICE A DAY. NEW DIRECTIONS 90 tablet 4    ondansetron (ZOFRAN-ODT) 4 MG TbDL Take 1-2 tablets (4-8 mg total) by mouth every 8 (eight) hours as needed. 15 tablet 0    potassium 99 mg Tab Take 1 tablet by mouth once.      simvastatin (ZOCOR) 20 MG  "tablet Take 20 mg by mouth every evening.      thyroid, pork, (ARMOUR THYROID) 30 mg Tab Take 1 tablet (30 mg total) by mouth before breakfast. 30 tablet 1    topiramate (TOPAMAX) 25 MG tablet TAKE 1 TABLET BY MOUTH EVERY DAY AT NIGHT 90 tablet 3    ALPRAZolam (XANAX) 0.5 MG tablet Take 1 tablet (0.5 mg total) by mouth nightly as needed for Anxiety. 30 tablet 0    gabapentin (NEURONTIN) 100 MG capsule Take 1 capsule (100 mg total) by mouth 2 (two) times daily. 180 capsule 3    phenazopyridine (PYRIDIUM) 200 MG tablet Take 200 mg by mouth 3 (three) times daily. (Patient not taking: Reported on 11/19/2024)      terbinafine HCL (LAMISIL) 250 mg tablet Take 250 mg by mouth once daily. (Patient not taking: Reported on 11/19/2024)       No current facility-administered medications on file prior to visit.        REVIEW OF SYSTEMS  No fevers, chills, or unintended weight loss.     Objective      PHYSICAL EXAMINATION    Blood pressure 131/86, pulse 68, height 5' 7" (1.702 m), weight 71 kg (156 lb 8.4 oz).     General Exam: The patient is in no acute distress.  Psychiatric: The patient is alert, interactive, and has appropriate mood and affect.     Neurologic Examination:  Mental Status: Mental status is normal to conversation. The patient is able to recall the details of their medical history without difficulty. Speech is clear.  Language is grossly intact.    Gait: Normal gait.      ----------------     DIAGNOSTIC DATA     Laboratory Data:   None recent     Imaging Data (I have personally reviewed the imaging below):   10/2024  MRI Brain  FINDINGS:  Ventricles and sulci are normal in size for age without evidence of hydrocephalus.     Scattered foci of T2/FLAIR hyperintense signal within the supratentorial white matter, nonspecific and may reflect sequelae of mild chronic small vessel ischemic change. Small focus of encephalomalacia involving the right caudate body compatible with a chronic lacunar infarct.  " Diffusion-weighted images demonstrate no evidence of an acute infarct.   Susceptibility weighted images demonstrate no evidence of acute or chronic hemorrhage.     No abnormal intracranial enhancement.     Normal vascular flow voids are present.  Dural venous sinuses are patent.     Bone marrow signal intensity is normal.  The paranasal sinuses are normal.  Mild left and trace right partial fluid opacification of the mastoid air cells.     Bilateral lens replacements are noted.     Impression:     1. No acute intracranial abnormality or abnormal enhancement.  2. Mild generalized cerebral volume loss and scattered T2/FLAIR hyperintense signal within the supratentorial white matter, nonspecific but probably reflecting sequelae of chronic small vessel ischemic change.    MRA Head  FINDINGS:  The internal carotid arteries are of normal caliber.  There are no areas of significant atherosclerotic narrowing. The anterior cerebral arteries are normal. The middle cerebral arteries are normal. The vertebral arteries are patent. The basilar artery is normal.  The posterior cerebral arteries are normal. No high-grade stenosis or large vessel occlusion.  There is no aneurysm or vascular malformation identified.  Although MRA is a screening examination, catheter angiography remains the definitive study for small aneurysms, vasculitis, and other vascular abnormalities.     Impression:     No intracranial high-grade stenosis, large vessel occlusion, aneurysm or arteriovenous malformation.    MRA Neck  FINDINGS:  The aortic arch and origins of the great vessels are normal.  There is an aberrant right subclavian artery, a normal anatomical variant.  Left common carotid artery arises from the brachiocephalic trunk.     The common carotid and cervical internal carotid arteries are of normal caliber. The carotid bifurcations are of normal caliber without atherosclerotic narrowing.     Cervical vertebral arteries are of normal caliber.   The vertebral arteries are codominant.     Although MRA is a screening examination, catheter angiography remains the definitive study for small aneurysms, vasculitis, and other vascular abnormalities.     Impression:     1. No hemodynamically significant stenosis, major branch occlusion, aneurysm or dissection in the neck arterial vasculature.  2. Incidental anatomical variants, as above.    MRV Head  FINDINGS:  Normal signal is demonstrated within the superior sagittal and transverse sinuses. The jugular veins are normal. No luminal filling defects are seen.     Impression:     No evidence of dural venous sinus thrombosis.     ----------------     Assessment & Plan      Carole JOSE Sarabia is a 74 y.o. woman with depression/anxiety, HLD, hypertension who is presenting to the [facility] for an office visit with a chief complaint of headache.    Ms. Sarabia is presenting for further management of her headache.  Headaches have been stable only 8 mild headaches per day and no recurrent thunderclap headaches since the initial one in August.  Vessel imaging was normal and MRI brain was only notable for a prior right lacunar infarct.  At this point, the etiology of her headache is still uncertain; but I suspect that it may have been a brief episode of occipital neuralgia from head positioning.  There is no evidence of aneurysm, vascular malformation, or RCVS.  She can continue her topiramate 25 mg at night and use Tylenol as rescue.  She can see me in headache clinic again in 3 months    Otherwise, the patient complained of ongoing bilateral paresthesias and pain in her feet bilaterally.  She has decreased range of motion of her great toes bilaterally that is distressing for her.  I will ask her to see Podiatry about that issue and will ask her to try EMLA cream for her neuropathic pain.  I will send a primary and secondary neuropathy screen today to rule out common causes of neuropathy.  If she wants  to see me in Pain Clinic to treat her foot pain I am more than willing to do that.  She would either have to self refer or have a consult from her primary care physician    1. Thunderclap onset headache, resolved  2. Migraine with visual aura, improved   3. Neuropathic pain      -- Diagnostic studies and referrals recommended:    -- A1c, B12, MMA, folate, TSH, T4, HIV, treponemal antibodies, SPEP, UPEP; refer to podiatry for decreased range of motion  -- Preventive:  Continue topiramate 25 at night  -- Supplements: Try Magnesium (oxide, glycinate, citrate, or combination) 400 mg by mouth twice per day (Side effect: stomach upset). Fine at local [employer] or [employer]. Try Riboflavin (VitB2) 200 mg by mouth twice per day (Side effect: bright yellow urine). Find at [employer] food store ([employer], etc.)  -- Abortive:  Continue Tylenol  -- Nausea/Vomiting: None.  -- Future options:  Increase topiramate, increase gabapentin (watch sedation), rizatriptan for rescue     -- Recommend lifestyle modifications including the SEEDS for success in headache management, including Sleep hygiene, Exercising regularly, Eating healthy and regular meals, Drinking water and maintaining a headache Diary, and Stress reduction.  -- Therapeutic education and advocacy:        -Access the Migraine Toolkit, [facility] Migraine Patient Toolkit        -Visit the American Migraine Foundation (americanmigrainefoundation.org) website and the [employer] Migraine Facebook group for additional patient education     -- Follow-up recommended: 1 month     Sascha Urrutia MD  Headache and Pain Management  [facility]    The total time spent for evaluation and management on including reviewing separately obtained history, performing a medically appropriate exam and evaluation, documenting clinical information in the health record, independently interpreting results and communicating them to the patient/family/caregiver, and  ordering medications/tests/procedures was 35 minutes.

## 2024-11-19 NOTE — PATIENT INSTRUCTIONS
-- Try EMLA for foot pain  -- Get neuropathy labs  -- Follow-up 3 months for headache and ask your doctor for a consult to pain to treat your neuropathy

## 2024-12-03 ENCOUNTER — LAB VISIT (OUTPATIENT)
Dept: LAB | Facility: HOSPITAL | Age: 74
End: 2024-12-03
Attending: INTERNAL MEDICINE
Payer: MEDICARE

## 2024-12-03 DIAGNOSIS — G62.9 NEUROPATHY: ICD-10-CM

## 2024-12-03 LAB
PROT 24H UR-MRATE: NORMAL MG/SPEC (ref 0–100)
PROT UR-MCNC: <7 MG/DL (ref 0–15)
URINE COLLECTION DURATION: 24 HR
URINE VOLUME: 700 ML

## 2024-12-03 PROCEDURE — 84156 ASSAY OF PROTEIN URINE: CPT | Performed by: INTERNAL MEDICINE

## 2024-12-03 PROCEDURE — 84166 PROTEIN E-PHORESIS/URINE/CSF: CPT | Mod: 26,,, | Performed by: PATHOLOGY

## 2024-12-03 PROCEDURE — 84166 PROTEIN E-PHORESIS/URINE/CSF: CPT | Performed by: INTERNAL MEDICINE

## 2024-12-05 LAB — PROT PATTERN UR ELPH-IMP: NORMAL

## 2024-12-06 LAB — PATHOLOGIST INTERPRETATION UPE: NORMAL

## 2024-12-17 ENCOUNTER — TELEPHONE (OUTPATIENT)
Dept: PAIN MEDICINE | Facility: CLINIC | Age: 74
End: 2024-12-17
Payer: MEDICARE

## 2025-02-17 ENCOUNTER — TELEPHONE (OUTPATIENT)
Dept: NEUROLOGY | Facility: CLINIC | Age: 75
End: 2025-02-17
Payer: MEDICARE

## 2025-02-17 NOTE — TELEPHONE ENCOUNTER
Attempted to confirm appointment. Patient states she is unable to make it and will need to reschedule. Appointment rescheduled to 03-25-25 at 11:30 am with Dr. Urrutia.

## 2025-03-25 ENCOUNTER — OFFICE VISIT (OUTPATIENT)
Dept: NEUROLOGY | Facility: CLINIC | Age: 75
End: 2025-03-25
Payer: MEDICARE

## 2025-03-25 VITALS
HEART RATE: 51 BPM | BODY MASS INDEX: 24.5 KG/M2 | WEIGHT: 156.44 LBS | DIASTOLIC BLOOD PRESSURE: 77 MMHG | SYSTOLIC BLOOD PRESSURE: 158 MMHG

## 2025-03-25 DIAGNOSIS — E13.40 OTHER SPECIFIED DIABETES MELLITUS WITH DIABETIC NEUROPATHY, UNSPECIFIED: ICD-10-CM

## 2025-03-25 DIAGNOSIS — R26.81 UNSTEADINESS ON FEET: ICD-10-CM

## 2025-03-25 DIAGNOSIS — E08.40 DIABETES MELLITUS DUE TO UNDERLYING CONDITION WITH DIABETIC NEUROPATHY, WITHOUT LONG-TERM CURRENT USE OF INSULIN: ICD-10-CM

## 2025-03-25 DIAGNOSIS — E13.49 OTHER SPECIFIED DIABETES MELLITUS WITH OTHER DIABETIC NEUROLOGICAL COMPLICATION: ICD-10-CM

## 2025-03-25 DIAGNOSIS — G62.9 NEUROPATHY: Primary | ICD-10-CM

## 2025-03-25 PROCEDURE — 99999 PR PBB SHADOW E&M-EST. PATIENT-LVL IV: CPT | Mod: PBBFAC,,, | Performed by: INTERNAL MEDICINE

## 2025-03-25 PROCEDURE — 99214 OFFICE O/P EST MOD 30 MIN: CPT | Mod: PBBFAC,PO | Performed by: INTERNAL MEDICINE

## 2025-03-25 NOTE — PROGRESS NOTES
Neurology Headache [facility] - [facility]  Return Patient Visit     Subjective      REFERRAL SOURCE  No ref. provider found          CHIEF COMPLAINT/REASON FOR REFERRAL  Headache      HISTORY OF PRESENT ILLNESS  Carole Sarabia is a 74 y.o. woman with depression/anxiety, HLD, hypertension, prediabetes, who is presenting to the [facility] for an office visit with a chief complaint of headache.     The patient states that they began to experience headaches around her early 20's with her menstrual period. She would have occasional headaches also outside of her period if she had stressful situations. She noticed that her headache was trigger with light, sound, etc. She started Imitrex at some point in her 30's which she was told made her blood pressure too high. Topiramate was started at some point that was somewhat helpful.     She completed menopause in her 50's and noticed her headaches improved. Headache continued to be well-controlled until 2024. At that time, she states that she started to experience headache on the left side. She was checking on a cake in her kitchen and stooped low to look in the oven. She when she stood up she felt a sharp pain on the right. She stood up and then felt that the pain was quite severe for 10 min. She sat down and took APAP and while it improved she noticed that headache would worsen with certain positions. She noticed that whenever she would look at her phone her headache worsened. She has had to turn her light down on her phone.     She says that her pain has improved over the two months with lifestyle adaptations.    Otherwise, the patient experienced a fall in  or  that resulted in head injury after losing her balance. She was told that she did not have a concussion.       NPV headache characteristics:  Headache Frequency:        Total: 3        Disablin     Duration of attacks: seconds minutes hours days: hours intially but now  "relatively headache free  Timing to max pain: seconds minutes hours: seconds for first headache in 8/2024 but headaches improved since then  Headache location:    right headache location: temporal   left headache location: frontal  Quality: headache quality: throbbing / pulsating and sharp/shooting/stabbing; thunderclap for right sided headache  Intensity: mild moderate moderate-to-severe severe: moderate to severe and severe  Associated symptoms: headache associated sx: photophobia, phonophobia, osmophobia, aggravation with routine physical activity, and nausea  Unilateral cranial autonomic features or restlessness:   Premonitory symptoms:   Aura symptoms:   Type: visual   Duration: migraine aura duration: 5 minutes - 60 minutes  Headache Red Flags:        Fevers/Chills/Unintended Weight Loss: no        Focal weakness: no        Thunderclap onset: yes - right temporal        Start a headache with coughing/sneezing/bending over: no        Headache absolutely worse with certain positions (lying down vs standing up): yes - worse with standing (first episode)        Double vision: no        Loss of color vision: no        Pulsatile or whooshing tinnitus: no  Triggers: Light / sound  Neck pain: no Radiation no  Jaw pain/cramping with chewing: no  Symptoms of dysautonomia: no     Prior non-pharm treatments (biofeedback, CBT, PT, chiropractor, acupuncture, massage): no  History of asthma, constipation, kidney stones: no  Psychiatric comorbidities: anxiety/depression  History of Head Trauma: yes - 2022 or 2023  Hypertension, Hyperlipidemia: yes - both  Prior stroke: "light stroke" - many years ago. Speech was off for a couple of weeks  Coronary artery disease: no  Vascular malformation or aneurysm: no  Caffeine use: yes - nearly every 1 cup  Sleep comorbidities: Snoring present, witnessed apneas absent, sleep study no     Family history of headaches:  yes - great grandmother had headache that was very bad; son had " headache     Last dilated eye exam: within the last 6 months Any abnormalities? yes - checking for glaucoma     Menstrual status:  Did attacks start around menarche? yes  Is the patient menopausal? Using HRT? no     SOCIAL HISTORY  The patient currently lives in [place], [place]. They are able to perform all ADLs without assistance. They are not working currently - previously in the [employer]. They currently smoke 0 packs per day. They drink 0 EtOH containing beverages per day. No illicits.     Interval History  11/19/2024: Last seen in 10/2024 for NPV. At that time, assessment was that she has a background of migraine that was under relatively good control and then in August 2024 she stood up abruptly from her stove and felt sudden onset of right-sided head pain.  This headache persisted for about a month.  Plan was to obtain imaging and inflammatory markers, which was notable only for evidence for prior right caudate lacunar infarct. She will have only 8 mild headache days per month that are easily treated by acetaminophen. Otherwise, the patient states since she has been having bothersome, burning pain in her feet.  She also has some restricted range of motion of her great toes bilaterally.  She is on gabapentin 100 mg that it makes her sedated.    3/25/2025: Last seen 11/2024. Plan was A1c, B12, MMA, folate, TSH, T4, HIV, treponemal antibodies, SPEP, UPEP; refer to podiatry for decreased range of motion. She was considering self-referring to pain clinic for foot pain. She states that headache is doing well overall. Headache is happening 4 days per month and those days are easily treated by APAP and icepack.       ===      Current Acute Headache Medications:  -- APAP 500 mg x 2 when headache worsened - works well     Number of Days with acute medication use per week: 0-1     Current Prophylactic Headache Medications:  -- topiramate 25 mg PO QHS  -- metoprolol 25 mg PO QD  -- Bupropion 100 mg PO BID     Previous headache  treatment that was ineffective or not tolerated:  Acute: Norco  Preventive: Gabapentin 100 mg PO QHS  Devices: None    Procedures: None    ----------------     Past Medical History:   Diagnosis Date    Depression     Fibrocystic breast     GERD (gastroesophageal reflux disease)     Goiter     Hyperlipidemia     Hypertension     Hypothyroidism         Current Outpatient Medications on File Prior to Visit   Medication Sig Dispense Refill    ALPRAZolam (XANAX) 0.5 MG tablet Take 1 tablet (0.5 mg total) by mouth nightly as needed for Anxiety. 30 tablet 0    aspirin (ECOTRIN) 81 MG EC tablet Take 81 mg by mouth once daily.      atorvastatin (LIPITOR) 10 MG tablet Take 1 tablet (10 mg total) by mouth every evening. 90 tablet 3    B6-levomefolate-B12-D3-ALA (EB-N5 DR) 35 mg-3 mg- 2 mg-62.5 mcg CpDR Take 1 capsule by mouth Daily.      buPROPion (WELLBUTRIN) 100 MG tablet TAKE 1 TABLET (100 MG TOTAL) BY MOUTH 3 (THREE) TIMES DAILY. 270 tablet 1    cholecalciferol, vitamin D3, (VITAMIN D3) 50 mcg (2,000 unit) Cap Take 1 capsule by mouth once daily.      clonazePAM (KLONOPIN) 1 MG tablet Take 2 mg by mouth 3 (three) times daily as needed for Anxiety.      cyanocobalamin-cobamamide 5,000-100 mcg Subl Place under the tongue.      dicyclomine (BENTYL) 20 mg tablet Take 20 mg by mouth every 6 (six) hours.      diphenoxylate-atropine 2.5-0.025 mg (LOMOTIL) 2.5-0.025 mg per tablet TAKE 1 TABLET BY MOUTH THREE TIMES A DAY AS NEEDED DIARRHEA 30 tablet 1    esomeprazole magnesium (NEXIUM ORAL) Take 20 mg by mouth once daily.      folic acid/multivit-min/lutein (CENTRUM SILVER ORAL) Take 1 capsule by mouth once daily.      Lactobacillus rhamnosus GG (CULTURELLE) 10 billion cell capsule Take 1 capsule by mouth once daily.      levocetirizine (XYZAL) 5 MG tablet Take 1 tablet (5 mg total) by mouth every evening. 90 tablet 3    levothyroxine (LEVOXYL) 25 MCG tablet One daily 30 tablet 2    levothyroxine (LEVOXYL) 25 MCG tablet One daily 90  tablet 0    levothyroxine (SYNTHROID) 25 MCG tablet Take 1 tablet (25 mcg total) by mouth before breakfast. 90 tablet 1    LIDOcaine-prilocaine (EMLA) cream Apply topically as needed (neuropathy). 25 g 2    metoprolol succinate (TOPROL-XL) 25 MG 24 hr tablet Take 1 tablet (25 mg total) by mouth every 12 (twelve) hours. TAKE 1/2 TABLET BY MOUTH TWICE A DAY. NEW DIRECTIONS 180 tablet 4    ondansetron (ZOFRAN-ODT) 4 MG TbDL Take 1-2 tablets (4-8 mg total) by mouth every 8 (eight) hours as needed. 15 tablet 0    terbinafine HCL (LAMISIL) 250 mg tablet Take 250 mg by mouth once daily.      thyroid, pork, (ARMOUR THYROID) 30 mg Tab Take 1 tablet (30 mg total) by mouth before breakfast. 30 tablet 1    topiramate (TOPAMAX) 25 MG tablet TAKE 1 TABLET BY MOUTH EVERY DAY AT NIGHT 90 tablet 3     No current facility-administered medications on file prior to visit.        REVIEW OF SYSTEMS  No fevers, chills, or unintended weight loss.     Objective      PHYSICAL EXAMINATION    There were no vitals taken for this visit.     General Exam: The patient is in no acute distress.  Psychiatric: The patient is alert, interactive, and has appropriate mood and affect.     Neurologic Examination:  Mental Status: Mental status is normal to conversation. The patient is able to recall the details of their medical history without difficulty. Speech is clear.  Language is grossly intact.    Gait: Normal gait.      ----------------     DIAGNOSTIC DATA     Laboratory Data:   None recent     Imaging Data (I have personally reviewed the imaging below):   10/2024  MRI Brain  FINDINGS:  Ventricles and sulci are normal in size for age without evidence of hydrocephalus.     Scattered foci of T2/FLAIR hyperintense signal within the supratentorial white matter, nonspecific and may reflect sequelae of mild chronic small vessel ischemic change. Small focus of encephalomalacia involving the right caudate body compatible with a chronic lacunar infarct.   Diffusion-weighted images demonstrate no evidence of an acute infarct.   Susceptibility weighted images demonstrate no evidence of acute or chronic hemorrhage.     No abnormal intracranial enhancement.     Normal vascular flow voids are present.  Dural venous sinuses are patent.     Bone marrow signal intensity is normal.  The paranasal sinuses are normal.  Mild left and trace right partial fluid opacification of the mastoid air cells.     Bilateral lens replacements are noted.     Impression:     1. No acute intracranial abnormality or abnormal enhancement.  2. Mild generalized cerebral volume loss and scattered T2/FLAIR hyperintense signal within the supratentorial white matter, nonspecific but probably reflecting sequelae of chronic small vessel ischemic change.    MRA Head  FINDINGS:  The internal carotid arteries are of normal caliber.  There are no areas of significant atherosclerotic narrowing. The anterior cerebral arteries are normal. The middle cerebral arteries are normal. The vertebral arteries are patent. The basilar artery is normal.  The posterior cerebral arteries are normal. No high-grade stenosis or large vessel occlusion.  There is no aneurysm or vascular malformation identified.  Although MRA is a screening examination, catheter angiography remains the definitive study for small aneurysms, vasculitis, and other vascular abnormalities.     Impression:     No intracranial high-grade stenosis, large vessel occlusion, aneurysm or arteriovenous malformation.    MRA Neck  FINDINGS:  The aortic arch and origins of the great vessels are normal.  There is an aberrant right subclavian artery, a normal anatomical variant.  Left common carotid artery arises from the brachiocephalic trunk.     The common carotid and cervical internal carotid arteries are of normal caliber. The carotid bifurcations are of normal caliber without atherosclerotic narrowing.     Cervical vertebral arteries are of normal caliber.   The vertebral arteries are codominant.     Although MRA is a screening examination, catheter angiography remains the definitive study for small aneurysms, vasculitis, and other vascular abnormalities.     Impression:     1. No hemodynamically significant stenosis, major branch occlusion, aneurysm or dissection in the neck arterial vasculature.  2. Incidental anatomical variants, as above.    MRV Head  FINDINGS:  Normal signal is demonstrated within the superior sagittal and transverse sinuses. The jugular veins are normal. No luminal filling defects are seen.     Impression:     No evidence of dural venous sinus thrombosis.     ----------------     Assessment & Plan      Carole Sarabia is a 74 y.o. woman with depression/anxiety, HLD, hypertension who is presenting to the [facility] for an office visit with a chief complaint of headache.     Last seen 11/2024. Plan was A1c, B12, MMA, folate, TSH, T4, HIV, treponemal antibodies, SPEP, UPEP; refer to podiatry for decreased range of motion. She was considering self-referring to pain clinic for foot pain. She states that headache is doing well overall. Headache is happening 4 days per month and those days are easily treated by APAP and icepack.     Headaches continue to be under very good control. Will re-order neuropathy labs but the patient is hesitant to get them because she is worried about getting charged. She will consider referral to pain management for neuropathic pain    1. Thunderclap onset headache, resolved  2. Migraine with visual aura, improved   3. Neuropathic pain      -- Diagnostic studies and referrals recommended:    -- A1c, B12, MMA, folate, TSH, T4, HIV, treponemal antibodies again  -- Preventive:  Continue topiramate 25 at night  -- Supplements: Try Magnesium (oxide, glycinate, citrate, or combination) 400 mg by mouth twice per day (Side effect: stomach upset). Fine at local [employer] or [employer]. Try Riboflavin  (VitB2) 200 mg by mouth twice per day (Side effect: bright yellow urine). Find at Health food store ([employer], etc.)  -- Abortive:  Continue Tylenol  -- Nausea/Vomiting: None.  -- Future options:  Increase topiramate, increase gabapentin (watch sedation), rizatriptan for rescue     -- Recommend lifestyle modifications including the SEEDS for success in headache management, including Sleep hygiene, Exercising regularly, Eating healthy and regular meals, Drinking water and maintaining a headache Diary, and Stress reduction.  -- Therapeutic education and advocacy:        -Access the Migraine Toolkit, [facility] Migraine Patient Toolkit        -Visit the American Migraine Foundation (americanmigrainefoundation.org) website and the [employer] Migraine Facebook group for additional patient education     -- Follow-up recommended: 3 month     Sascha Urrutia MD  Headache and Pain Management  [facility]    The total time spent for evaluation and management on including reviewing separately obtained history, performing a medically appropriate exam and evaluation, documenting clinical information in the health record, independently interpreting results and communicating them to the patient/family/caregiver, and ordering medications/tests/procedures was 16 minutes.    Level 4 by [facility]    : Visit today included increased complexity associated with the care of the patient's problem. I addressed and am managing the longitudinal care of the patient due to the serious and/or complex managed problem(s).

## 2025-04-17 ENCOUNTER — TELEPHONE (OUTPATIENT)
Dept: PAIN MEDICINE | Facility: CLINIC | Age: 75
End: 2025-04-17
Payer: MEDICARE

## 2025-04-17 NOTE — TELEPHONE ENCOUNTER
Attempted to call patient to discuss appointment scheduled for May 8 with Dr. Urrutia needing to r/s due to provider being out. No answer, left message to call back.

## 2025-04-22 ENCOUNTER — TELEPHONE (OUTPATIENT)
Dept: PAIN MEDICINE | Facility: CLINIC | Age: 75
End: 2025-04-22
Payer: MEDICARE

## 2025-04-22 NOTE — TELEPHONE ENCOUNTER
Attempted to call patient to reschedule appointment due to Dr. Urrutia being out May 8th. No answer, left message to call back.

## 2025-04-23 ENCOUNTER — TELEPHONE (OUTPATIENT)
Dept: PAIN MEDICINE | Facility: CLINIC | Age: 75
End: 2025-04-23
Payer: MEDICARE

## 2025-04-23 NOTE — TELEPHONE ENCOUNTER
----- Message from Malini sent at 4/23/2025 11:28 AM CDT -----  Type:  Patient Returning Call  Who Called:pt   Who Left Message for Patient:Dianelys  Does the patient know what this is regarding?:yes  Would the patient rather a call back or a response via [facility]? Call back  Best Call Back Number:[phone]  Additional Information: pt missed call. She added the office to her contact list so they can reach out to her.   Please call Back to advise. Thanks!

## 2025-04-23 NOTE — TELEPHONE ENCOUNTER
Spoke with patient's spouse, informed him that I have been trying to get in contact with patient to reschedule appointment on May 8th with Dr. Urrutia. Patient's spouse states to call back in 10 minutes or so as he is on his way to their home. I v/u.

## 2025-04-24 ENCOUNTER — OFFICE VISIT (OUTPATIENT)
Dept: OBSTETRICS AND GYNECOLOGY | Facility: CLINIC | Age: 75
End: 2025-04-24
Payer: MEDICARE

## 2025-04-24 VITALS
WEIGHT: 158.75 LBS | BODY MASS INDEX: 24.86 KG/M2 | SYSTOLIC BLOOD PRESSURE: 118 MMHG | DIASTOLIC BLOOD PRESSURE: 66 MMHG

## 2025-04-24 DIAGNOSIS — N81.6 PELVIC ORGAN PROLAPSE QUANTIFICATION STAGE 1 RECTOCELE: Primary | ICD-10-CM

## 2025-04-24 DIAGNOSIS — N81.4 CYSTOCELE WITH UTERINE PROLAPSE: ICD-10-CM

## 2025-04-24 PROCEDURE — 99214 OFFICE O/P EST MOD 30 MIN: CPT | Mod: PBBFAC,PN | Performed by: SPECIALIST

## 2025-04-24 PROCEDURE — 57160 INSERT PESSARY/OTHER DEVICE: CPT | Mod: PBBFAC,PN | Performed by: SPECIALIST

## 2025-04-24 PROCEDURE — 99999 PR PBB SHADOW E&M-EST. PATIENT-LVL IV: CPT | Mod: PBBFAC,,, | Performed by: SPECIALIST

## 2025-04-24 RX ORDER — NITROFURANTOIN 25; 75 MG/1; MG/1
100 CAPSULE ORAL 2 TIMES DAILY
COMMUNITY
Start: 2025-04-18

## 2025-04-24 RX ORDER — FLUCONAZOLE 200 MG/1
200 TABLET ORAL
COMMUNITY
Start: 2025-04-18

## 2025-04-24 NOTE — PROGRESS NOTES
75 yo WF Grade 3 midline cystocele returns for follow up Pt fitted with Gelhorn pessary 6 months ago Today Pt denies pain, vaginal d/c, PROSPER and states she has not had return of UTI s/s nor overt infection since pessary placement       Past Medical History:   Diagnosis Date    Depression      Fibrocystic breast      GERD (gastroesophageal reflux disease)      Goiter      Hyperlipidemia      Hypertension      Hypothyroidism                 Past Surgical History:   Procedure Laterality Date    BILATERAL TUBAL LIGATION        CHOLECYSTECTOMY        THUMB ARTHROSCOPY                    Family History   Problem Relation Name Age of Onset    Heart disease Father        Cancer Father        Kidney cancer Father        Arthritis Mother        Diabetes Brother        No Known Problems Sister        Breast cancer Neg Hx        Ovarian cancer Neg Hx        Colon cancer Neg Hx        Cervical cancer Neg Hx        Uterine cancer Neg Hx             Social History            Socioeconomic History    Marital status:     Number of children: 2   Occupational History    Occupation: housewife   Tobacco Use    Smoking status: Never    Smokeless tobacco: Never   Substance and Sexual Activity    Alcohol use: No    Drug use: No    Sexual activity: Not Currently       Birth control/protection: Post-menopausal         Current Medications          Current Outpatient Medications   Medication Sig Dispense Refill    aspirin (ECOTRIN) 81 MG EC tablet Take 81 mg by mouth once daily.        atorvastatin (LIPITOR) 10 MG tablet Take 10 mg by mouth once daily.        B6-levomefolate-B12-D3-ALA (EB-N5 DR) 35 mg-3 mg- 2 mg-62.5 mcg CpDR Take 1 capsule by mouth Daily.        BENICAR 20 mg tablet Take 20 mg by mouth once daily.   3    buPROPion (WELLBUTRIN) 100 MG tablet TAKE 1 TABLET (100 MG TOTAL) BY MOUTH 3 (THREE) TIMES DAILY. 270 tablet 1    cholecalciferol, vitamin D3, (VITAMIN D3) 50 mcg (2,000 unit) Cap Take 1 capsule by mouth once daily.         cyanocobalamin-cobamamide 5,000-100 mcg Subl Place under the tongue.        diphenoxylate-atropine 2.5-0.025 mg (LOMOTIL) 2.5-0.025 mg per tablet TAKE 1 TABLET BY MOUTH THREE TIMES A DAY AS NEEDED DIARRHEA 30 tablet 1    folic acid/multivit-min/lutein (CENTRUM SILVER ORAL) Take 1 capsule by mouth once daily.        HYDROcodone-acetaminophen (NORCO) 5-325 mg per tablet Take 1 tablet by mouth every 6 (six) hours as needed for Pain. 15 tablet 0    Lactobacillus rhamnosus GG (CULTURELLE) 10 billion cell capsule Take 1 capsule by mouth once daily.        levocetirizine (XYZAL) 5 MG tablet Take 1 tablet (5 mg total) by mouth every evening. 90 tablet 3    levothyroxine (LEVOXYL) 25 MCG tablet One daily 30 tablet 2    levothyroxine (LEVOXYL) 25 MCG tablet One daily 90 tablet 0    levothyroxine (SYNTHROID) 25 MCG tablet Take 1 tablet (25 mcg total) by mouth before breakfast. 90 tablet 1    metoprolol succinate (TOPROL-XL) 50 MG 24 hr tablet TAKE 1/2 TABLET BY MOUTH TWICE A DAY. NEW DIRECTIONS 90 tablet 4    ondansetron (ZOFRAN-ODT) 4 MG TbDL Take 1-2 tablets (4-8 mg total) by mouth every 8 (eight) hours as needed. 15 tablet 0    potassium 99 mg Tab Take 1 tablet by mouth once.        simvastatin (ZOCOR) 20 MG tablet Take 20 mg by mouth every evening.        terbinafine HCL (LAMISIL) 250 mg tablet Take 250 mg by mouth once daily.        thyroid, pork, (ARMOUR THYROID) 30 mg Tab Take 1 tablet (30 mg total) by mouth before breakfast. 30 tablet 1    topiramate (TOPAMAX) 25 MG tablet TAKE 1 TABLET BY MOUTH EVERY DAY AT NIGHT 90 tablet 3    ALPRAZolam (XANAX) 0.5 MG tablet Take 1 tablet (0.5 mg total) by mouth nightly as needed for Anxiety. 30 tablet 0    dicyclomine (BENTYL) 20 mg tablet Take 20 mg by mouth every 6 (six) hours. (Patient not taking: Reported on 10/2/2024)        esomeprazole magnesium (NEXIUM ORAL) Take 20 mg by mouth once daily.  (Patient not taking: Reported on 10/2/2024)        gabapentin (NEURONTIN) 100  MG capsule Take 1 capsule (100 mg total) by mouth 2 (two) times daily. 180 capsule 3    phenazopyridine (PYRIDIUM) 200 MG tablet Take 200 mg by mouth 3 (three) times daily. (Patient not taking: Reported on 10/2/2024)          No current facility-administered medications for this visit.                  Review of patient's allergies indicates:   Allergen Reactions    Gluten      Macrobid [nitrofurantoin monohyd/m-cryst] Itching and Other (See Comments)       Pain  Sluggish    Codeine Nausea Only    Sulfa (sulfonamide antibiotics) Nausea Only         Review of System:   General: no chills, fever, night sweats, weight gain or weight loss  Psychological: no depression or suicidal ideation  Breasts: no new or changing breast lumps, nipple discharge or masses.  Respiratory: no cough, shortness of breath, or wheezing  Cardiovascular: no chest pain or dyspnea on exertion  Gastrointestinal: no abdominal pain, change in bowel habits, or black or bloody stools  Genito-Urinary: no incontinence, urinary frequency/urgency or , pelvic pain or abnormal vaginal bleeding.  Musculoskeletal: no gait disturbance or muscular weakness         Expand All Collapse All    74 yo WF followed for Grade 3 midline cystocele and POP 1 cervicouterine prolpase  returns for 3cm Gelhorn pessary fitting          Past Medical History:   Diagnosis Date    Depression      Fibrocystic breast      GERD (gastroesophageal reflux disease)      Goiter      Hyperlipidemia      Hypertension      Hypothyroidism                     Past Surgical History:   Procedure Laterality Date    BILATERAL TUBAL LIGATION        CHOLECYSTECTOMY        THUMB ARTHROSCOPY                         Family History   Problem Relation Name Age of Onset    Heart disease Father        Cancer Father        Kidney cancer Father        Arthritis Mother        Diabetes Brother        No Known Problems Sister        Breast cancer Neg Hx        Ovarian cancer Neg Hx        Colon cancer Neg Hx         Cervical cancer Neg Hx        Uterine cancer Neg Hx             Social History                Socioeconomic History    Marital status:     Number of children: 2   Occupational History    Occupation: housewife   Tobacco Use    Smoking status: Never    Smokeless tobacco: Never   Substance and Sexual Activity    Alcohol use: No    Drug use: No    Sexual activity: Not Currently       Birth control/protection: Post-menopausal         Current Medications               Current Outpatient Medications   Medication Sig Dispense Refill    ALPRAZolam (XANAX) 0.5 MG tablet Take 1 tablet (0.5 mg total) by mouth nightly as needed for Anxiety. 30 tablet 0    aspirin (ECOTRIN) 81 MG EC tablet Take 81 mg by mouth once daily.        atorvastatin (LIPITOR) 10 MG tablet Take 10 mg by mouth once daily.        B6-levomefolate-B12-D3-ALA (EB-N5 DR) 35 mg-3 mg- 2 mg-62.5 mcg CpDR Take 1 capsule by mouth Daily.        BENICAR 20 mg tablet Take 20 mg by mouth once daily.   3    buPROPion (WELLBUTRIN) 100 MG tablet TAKE 1 TABLET (100 MG TOTAL) BY MOUTH 3 (THREE) TIMES DAILY. 270 tablet 1    cholecalciferol, vitamin D3, (VITAMIN D3) 50 mcg (2,000 unit) Cap Take 1 capsule by mouth once daily.        cyanocobalamin-cobamamide 5,000-100 mcg Subl Place under the tongue.        diphenoxylate-atropine 2.5-0.025 mg (LOMOTIL) 2.5-0.025 mg per tablet TAKE 1 TABLET BY MOUTH THREE TIMES A DAY AS NEEDED DIARRHEA 30 tablet 1    folic acid/multivit-min/lutein (CENTRUM SILVER ORAL) Take 1 capsule by mouth once daily.        HYDROcodone-acetaminophen (NORCO) 5-325 mg per tablet Take 1 tablet by mouth every 6 (six) hours as needed for Pain. 15 tablet 0    Lactobacillus rhamnosus GG (CULTURELLE) 10 billion cell capsule Take 1 capsule by mouth once daily.        levocetirizine (XYZAL) 5 MG tablet Take 1 tablet (5 mg total) by mouth every evening. 90 tablet 3    levothyroxine (LEVOXYL) 25 MCG tablet One daily 30 tablet 2    levothyroxine (LEVOXYL) 25  MCG tablet One daily 90 tablet 0    levothyroxine (SYNTHROID) 25 MCG tablet Take 1 tablet (25 mcg total) by mouth before breakfast. 90 tablet 1    metoprolol succinate (TOPROL-XL) 50 MG 24 hr tablet TAKE 1/2 TABLET BY MOUTH TWICE A DAY. NEW DIRECTIONS 90 tablet 4    ondansetron (ZOFRAN-ODT) 4 MG TbDL Take 1-2 tablets (4-8 mg total) by mouth every 8 (eight) hours as needed. 15 tablet 0    potassium 99 mg Tab Take 1 tablet by mouth once.        simvastatin (ZOCOR) 20 MG tablet Take 20 mg by mouth every evening.        terbinafine HCL (LAMISIL) 250 mg tablet Take 250 mg by mouth once daily.        thyroid, pork, (ARMOUR THYROID) 30 mg Tab Take 1 tablet (30 mg total) by mouth before breakfast. 30 tablet 1    topiramate (TOPAMAX) 25 MG tablet TAKE 1 TABLET BY MOUTH EVERY DAY AT NIGHT 90 tablet 3    dicyclomine (BENTYL) 20 mg tablet Take 20 mg by mouth every 6 (six) hours. (Patient not taking: Reported on 5/16/2024)        esomeprazole magnesium (NEXIUM ORAL) Take 20 mg by mouth once daily.  (Patient not taking: Reported on 5/16/2024)        gabapentin (NEURONTIN) 100 MG capsule Take 1 capsule (100 mg total) by mouth 2 (two) times daily. 180 capsule 3    phenazopyridine (PYRIDIUM) 200 MG tablet Take 200 mg by mouth 3 (three) times daily. (Patient not taking: Reported on 5/16/2024)          No current facility-administered medications for this visit.                      Review of patient's allergies indicates:   Allergen Reactions    Gluten      Macrobid [nitrofurantoin monohyd/m-cryst] Itching and Other (See Comments)       Pain  Sluggish    Codeine Nausea Only    Sulfa (sulfonamide antibiotics) Nausea Only         Review of System:   General: no chills, fever, night sweats, weight gain or weight loss  Psychological: no depression or suicidal ideation  Breasts: no new or changing breast lumps, nipple discharge or masses.  Respiratory: no cough, shortness of breath, or wheezing  Cardiovascular: no chest pain or dyspnea on  exertion  Gastrointestinal: no abdominal pain, change in bowel habits, or black or bloody stools  Genito-Urinary: pelvic pressure , PROSPER PP  Musculoskeletal: no gait disturbance or muscular weakness      EXAM     As above  Pessary grasped with ring clamp and removed without difficulty  Pessary was cleansed with Chlorhexadine x 3min     Pessary fitting trial     Digital forefinger measurement sacral spine 5cm 6cm pubic rami with  2 3/4cm Gelhorne placed high at the cervix  Pt ambulated and supine Valsalva with good anterior support No decent     Ref [id number]/4  Lot [id number] 2/28/25        RTO 4 months for pessary maintience      I spent a total of 30 minutes on the day of the visit. This includes face to face time and non-face to face time preparing to see the patient (eg, review of tests), obtaining and/or reviewing separately obtained history, documenting clinical information in the electronic or other health record, independently interpreting results and communicating results to the patient/family/caregiver, or care coordinator.

## 2025-04-28 ENCOUNTER — TELEPHONE (OUTPATIENT)
Dept: PAIN MEDICINE | Facility: CLINIC | Age: 75
End: 2025-04-28
Payer: MEDICARE

## 2025-08-15 ENCOUNTER — RESULTS FOLLOW-UP (OUTPATIENT)
Dept: PAIN MEDICINE | Facility: CLINIC | Age: 75
End: 2025-08-15
Payer: MEDICARE

## 2025-08-27 ENCOUNTER — OFFICE VISIT (OUTPATIENT)
Dept: OBSTETRICS AND GYNECOLOGY | Facility: CLINIC | Age: 75
End: 2025-08-27
Payer: MEDICARE

## 2025-08-27 VITALS
DIASTOLIC BLOOD PRESSURE: 80 MMHG | WEIGHT: 157.19 LBS | SYSTOLIC BLOOD PRESSURE: 138 MMHG | BODY MASS INDEX: 24.62 KG/M2

## 2025-08-27 DIAGNOSIS — N81.4 CYSTOCELE WITH UTERINE PROLAPSE: Primary | ICD-10-CM

## 2025-08-27 PROCEDURE — 57160 INSERT PESSARY/OTHER DEVICE: CPT | Mod: PBBFAC,PN | Performed by: SPECIALIST

## 2025-08-27 PROCEDURE — 99999 PR PBB SHADOW E&M-EST. PATIENT-LVL III: CPT | Mod: PBBFAC,,, | Performed by: SPECIALIST

## 2025-08-27 PROCEDURE — 99213 OFFICE O/P EST LOW 20 MIN: CPT | Mod: PBBFAC,PN | Performed by: SPECIALIST

## 2025-08-27 PROCEDURE — 57160 INSERT PESSARY/OTHER DEVICE: CPT | Mod: S$PBB,,, | Performed by: SPECIALIST

## 2025-08-27 PROCEDURE — 99213 OFFICE O/P EST LOW 20 MIN: CPT | Mod: S$PBB,25,, | Performed by: SPECIALIST
